# Patient Record
Sex: FEMALE | Race: WHITE | NOT HISPANIC OR LATINO | Employment: FULL TIME | URBAN - METROPOLITAN AREA
[De-identification: names, ages, dates, MRNs, and addresses within clinical notes are randomized per-mention and may not be internally consistent; named-entity substitution may affect disease eponyms.]

---

## 2017-06-07 ENCOUNTER — OFFICE VISIT (OUTPATIENT)
Dept: DERMATOLOGY | Facility: CLINIC | Age: 37
End: 2017-06-07
Attending: DERMATOLOGY
Payer: COMMERCIAL

## 2017-06-07 DIAGNOSIS — D48.5 NEOPLASM OF UNCERTAIN BEHAVIOR OF SKIN: Primary | ICD-10-CM

## 2017-06-07 PROCEDURE — 11100 HC BIOPSY SKIN/SUBQ/MUC MEM, SINGLE LESION: CPT | Mod: ZF | Performed by: DERMATOLOGY

## 2017-06-07 PROCEDURE — 88305 TISSUE EXAM BY PATHOLOGIST: CPT | Performed by: DERMATOLOGY

## 2017-06-07 NOTE — LETTER
6/7/2017      RE: Lexii Mendoza  [address] [place] [address]       DERMATOLOGY CLINIC VISIT NOTE      S: Patient presents with enlarging subcutaneous papule on the L extensor forearm. No past treatment.  Also notes new intermittent hand swelling.     Patient Active Problem List   Diagnosis     NO ACTIVE PROBLEMS     Encounter for IUD insertion       No Known Allergies      Current Outpatient Prescriptions   Medication     albuterol (PROAIR HFA, PROVENTIL HFA, VENTOLIN HFA) 108 (90 BASE) MCG/ACT inhaler     adapalene (DIFFERIN) 0.1 % LOTN     IBUPROFEN PO     No current facility-administered medications for this visit.          O:  Exam with 1 cm subcutaneous mobile nodule on the extensor surface of the proximal L forearm.     Procedure Note:  Patient was consented to a punch biopsy. Area was cleansed with alcohol pad and area was infiltrated with 1ml of lidocaine with epinephrine. A 4 mm punch biopsy was performed and lesion was closed with a 4.0 prolene suture. Wound dressed with petrolatum and bandage. Patient to change dressing daily. Suture removal in 10 days.         A/P:  Neoplasm of uncertain behavior on the L extensor forearm. Biopsy today to evaluate for inflammatory /rheumatological etiologies such as rheumatoid nodule, interstitial granulomatous dermatitis, vs other dermal infiltrates.     F/up pending biopsy results.     Syeda Rowe MD  Dermatology Staff

## 2017-06-07 NOTE — MR AVS SNAPSHOT
After Visit Summary   6/7/2017    Lexii Mendoza    MRN: [id number]           Patient Information     Date Of Birth          1980        Visit Information        Provider Department      6/7/2017 9:10 AM Syeda Rowe MD [facility]        Today's Diagnoses     Neoplasm of uncertain behavior of skin    -  1       Follow-ups after your visit        Who to contact     Please call your clinic at [phone] to:    Ask questions about your health    Make or cancel appointments    Discuss your medicines    Learn about your test results    Speak to your doctor   If you have compliments or concerns about an experience at your clinic, or if you wish to file a complaint, please contact [facility] Patient Relations at [phone] or email us at [email or web address]         Additional Information About Your Visit        MyChart Information     [employer] gives you secure access to your electronic health record. If you see a primary care provider, you can also send messages to your care team and make appointments. If you have questions, please call your primary care clinic.  If you do not have a primary care provider, please call [phone] and they will assist you.      [employer] is an electronic gateway that provides easy, online access to your medical records. With [employer], you can request a clinic appointment, read your test results, renew a prescription or communicate with your care team.     To access your existing account, please contact your [facility] Clinic or call [phone] for assistance.        Care EveryWhere ID     This is your Care EveryWhere ID. This could be used by other organizations to access your [place] medical records  [id number]         Blood Pressure from Last 3 Encounters:   04/18/16 114/70   03/17/16 105/72   11/21/14 111/56    Weight from Last 3 Encounters:   04/18/16 117 lb 9.6 oz  (53.3 kg)   11/21/14 120 lb (54.4 kg)   12/07/13 130 lb (59 kg)              We Performed the Following     BIOPSY SKIN/SUBQ/MUC MEM, SINGLE LESION     Surgical pathology exam          Today's Medication Changes          These changes are accurate as of: 6/7/17 11:59 PM.  If you have any questions, ask your nurse or doctor.               Start taking these medicines.        Dose/Directions    lidocaine 1% with EPINEPHrine 1:100,000 1 %-1:041593 injection   Used for:  Neoplasm of uncertain behavior of skin        Dose:  3 mL   Inject 3 mLs into the skin once for 1 dose   Quantity:  3 mL   Refills:  0            Where to get your medicines      Some of these will need a paper prescription and others can be bought over the counter.  Ask your nurse if you have questions.     You don't need a prescription for these medications     lidocaine 1% with EPINEPHrine 1:100,000 1 %-1:480836 injection                Primary Care Provider Office Phone # Fax #    Zoila Harden [phone]       [facility] [address] [place] [address]        Thank you!     Thank you for choosing [facility]  for your care. Our goal is always to provide you with excellent care. Hearing back from our patients is one way we can continue to improve our services. Please take a few minutes to complete the written survey that you may receive in the mail after your visit with us. Thank you!             Your Updated Medication List - Protect others around you: Learn how to safely use, store and throw away your medicines at www.disposemymeds.org.          This list is accurate as of: 6/7/17 11:59 PM.  Always use your most recent med list.                   Brand Name Dispense Instructions for use    adapalene 0.1 % Lotn    DIFFERIN    3 Bottle    Externally apply topically At Bedtime Please dispense 3 month supply.  59 ml = 1 month supply       albuterol 108 (90 BASE) MCG/ACT Inhaler     PROAIR HFA/PROVENTIL HFA/VENTOLIN HFA    1 Inhaler    Inhale 2 puffs into the lungs every 6 hours as needed for shortness of breath / dyspnea or wheezing       IBUPROFEN PO      Take 400 mg by mouth       lidocaine 1% with EPINEPHrine 1:100,000 1 %-1:688510 injection     3 mL    Inject 3 mLs into the skin once for 1 dose

## 2017-06-08 RX ORDER — LIDOCAINE HYDROCHLORIDE AND EPINEPHRINE 10; 10 MG/ML; UG/ML
3 INJECTION, SOLUTION INFILTRATION; PERINEURAL ONCE
Qty: 3 ML | Refills: 0 | OUTPATIENT
Start: 2017-06-08 | End: 2017-06-08

## 2017-06-08 NOTE — PROGRESS NOTES
DERMATOLOGY CLINIC VISIT NOTE      S: Patient presents with enlarging subcutaneous papule on the L extensor forearm. No past treatment.  Also notes new intermittent hand swelling.     Patient Active Problem List   Diagnosis     NO ACTIVE PROBLEMS     Encounter for IUD insertion       No Known Allergies      Current Outpatient Prescriptions   Medication     albuterol (PROAIR HFA, PROVENTIL HFA, VENTOLIN HFA) 108 (90 BASE) MCG/ACT inhaler     adapalene (DIFFERIN) 0.1 % LOTN     IBUPROFEN PO     No current facility-administered medications for this visit.          O:  Exam with 1 cm subcutaneous mobile nodule on the extensor surface of the proximal L forearm.     Procedure Note:  Patient was consented to a punch biopsy. Area was cleansed with alcohol pad and area was infiltrated with 1ml of lidocaine with epinephrine. A 4 mm punch biopsy was performed and lesion was closed with a 4.0 prolene suture. Wound dressed with petrolatum and bandage. Patient to change dressing daily. Suture removal in 10 days.         A/P:  Neoplasm of uncertain behavior on the L extensor forearm. Biopsy today to evaluate for inflammatory /rheumatological etiologies such as rheumatoid nodule, interstitial granulomatous dermatitis, vs other dermal infiltrates.     F/up pending biopsy results.     Syeda Rowe MD  Dermatology Staff

## 2017-06-12 LAB — COPATH REPORT: NORMAL

## 2017-06-14 ENCOUNTER — TRANSFERRED RECORDS (OUTPATIENT)
Dept: HEALTH INFORMATION MANAGEMENT | Facility: CLINIC | Age: 37
End: 2017-06-14

## 2017-06-28 ENCOUNTER — TRANSFERRED RECORDS (OUTPATIENT)
Dept: HEALTH INFORMATION MANAGEMENT | Facility: CLINIC | Age: 37
End: 2017-06-28

## 2017-06-29 ENCOUNTER — TELEPHONE (OUTPATIENT)
Dept: DERMATOLOGY | Facility: CLINIC | Age: 37
End: 2017-06-29

## 2017-06-29 NOTE — TELEPHONE ENCOUNTER
CHERELLE received from Dr. Kimberli Moralez a local Rheumatologist calling to discuss patients Biopsy results. She would like Dr. Abreu to call her back on her Cell [phone].    Laure Rodgers RN

## 2017-08-16 DIAGNOSIS — L01.00 IMPETIGO: ICD-10-CM

## 2017-08-16 DIAGNOSIS — L30.9 DERMATITIS: Primary | ICD-10-CM

## 2017-08-16 RX ORDER — TRIAMCINOLONE ACETONIDE 1 MG/G
OINTMENT TOPICAL
Qty: 80 G | Refills: 5 | Status: SHIPPED | OUTPATIENT
Start: 2017-08-16 | End: 2019-04-25

## 2017-08-16 RX ORDER — MUPIROCIN 20 MG/G
OINTMENT TOPICAL
Qty: 22 G | Refills: 5 | Status: SHIPPED | OUTPATIENT
Start: 2017-08-16 | End: 2018-03-09

## 2017-09-13 DIAGNOSIS — H01.139 EYELID DERMATITIS, ECZEMATOUS, UNSPECIFIED LATERALITY: Primary | ICD-10-CM

## 2017-09-13 RX ORDER — TACROLIMUS 1 MG/G
OINTMENT TOPICAL
Qty: 30 G | Refills: 5 | Status: SHIPPED | OUTPATIENT
Start: 2017-09-13 | End: 2018-03-09

## 2017-09-25 ENCOUNTER — TRANSFERRED RECORDS (OUTPATIENT)
Dept: HEALTH INFORMATION MANAGEMENT | Facility: CLINIC | Age: 37
End: 2017-09-25

## 2017-09-25 LAB
CREAT SERPL-MCNC: 0.67 MG/DL (ref 0.5–1.3)
GFR SERPL CREATININE-BSD FRML MDRD: 104.7 MLMIN/1.73M2

## 2017-10-25 ENCOUNTER — TRANSFERRED RECORDS (OUTPATIENT)
Dept: HEALTH INFORMATION MANAGEMENT | Facility: CLINIC | Age: 37
End: 2017-10-25

## 2017-10-25 LAB
ALT SERPL-CCNC: 26 IU/L (ref 5–35)
AST SERPL-CCNC: 30 U/L (ref 5–34)
CREAT SERPL-MCNC: 0.66 MG/DL (ref 0.5–1.3)
GFR SERPL CREATININE-BSD FRML MDRD: 106.5 ML/MIN/1.73M2
HEP C HIM: NORMAL

## 2017-11-20 ENCOUNTER — TRANSFERRED RECORDS (OUTPATIENT)
Dept: HEALTH INFORMATION MANAGEMENT | Facility: CLINIC | Age: 37
End: 2017-11-20

## 2017-11-20 LAB
ALT SERPL-CCNC: 48 IU/L (ref 5–35)
AST SERPL-CCNC: 40 U/L (ref 5–34)
CREAT SERPL-MCNC: 0.62 MG/DL (ref 0.5–1.3)
GFR SERPL CREATININE-BSD FRML MDRD: 114.5 ML/MIN/1.73M2

## 2017-12-18 DIAGNOSIS — Z79.899 LONG TERM USE OF DRUG: Primary | ICD-10-CM

## 2017-12-18 LAB
ALBUMIN SERPL-MCNC: 4 G/DL (ref 3.4–5)
ALP SERPL-CCNC: 69 U/L (ref 40–150)
ALT SERPL W P-5'-P-CCNC: 34 U/L (ref 0–50)
ANION GAP SERPL CALCULATED.3IONS-SCNC: 5 MMOL/L (ref 3–14)
AST SERPL W P-5'-P-CCNC: 29 U/L (ref 0–45)
BASOPHILS # BLD AUTO: 0 10E9/L (ref 0–0.2)
BASOPHILS NFR BLD AUTO: 0.5 %
BILIRUB DIRECT SERPL-MCNC: 0.2 MG/DL (ref 0–0.2)
BILIRUB SERPL-MCNC: 0.7 MG/DL (ref 0.2–1.3)
BUN SERPL-MCNC: 14 MG/DL (ref 7–30)
CALCIUM SERPL-MCNC: 9.1 MG/DL (ref 8.5–10.1)
CHLORIDE SERPL-SCNC: 109 MMOL/L (ref 94–109)
CO2 SERPL-SCNC: 27 MMOL/L (ref 20–32)
CREAT SERPL-MCNC: 0.78 MG/DL (ref 0.52–1.04)
DIFFERENTIAL METHOD BLD: NORMAL
EOSINOPHIL # BLD AUTO: 0.1 10E9/L (ref 0–0.7)
EOSINOPHIL NFR BLD AUTO: 3.2 %
ERYTHROCYTE [DISTWIDTH] IN BLOOD BY AUTOMATED COUNT: 13.4 % (ref 10–15)
GFR SERPL CREATININE-BSD FRML MDRD: 82 ML/MIN/1.7M2
GLUCOSE SERPL-MCNC: 60 MG/DL (ref 70–99)
HCT VFR BLD AUTO: 41.1 % (ref 35–47)
HGB BLD-MCNC: 13.9 G/DL (ref 11.7–15.7)
IMM GRANULOCYTES # BLD: 0 10E9/L (ref 0–0.4)
IMM GRANULOCYTES NFR BLD: 0 %
LYMPHOCYTES # BLD AUTO: 1.7 10E9/L (ref 0.8–5.3)
LYMPHOCYTES NFR BLD AUTO: 39.6 %
MCH RBC QN AUTO: 31.9 PG (ref 26.5–33)
MCHC RBC AUTO-ENTMCNC: 33.8 G/DL (ref 31.5–36.5)
MCV RBC AUTO: 94 FL (ref 78–100)
MONOCYTES # BLD AUTO: 0.4 10E9/L (ref 0–1.3)
MONOCYTES NFR BLD AUTO: 8.1 %
NEUTROPHILS # BLD AUTO: 2.1 10E9/L (ref 1.6–8.3)
NEUTROPHILS NFR BLD AUTO: 48.6 %
NRBC # BLD AUTO: 0 10*3/UL
NRBC BLD AUTO-RTO: 0 /100
PLATELET # BLD AUTO: 205 10E9/L (ref 150–450)
POTASSIUM SERPL-SCNC: 4.6 MMOL/L (ref 3.4–5.3)
PROT SERPL-MCNC: 7.5 G/DL (ref 6.8–8.8)
RBC # BLD AUTO: 4.36 10E12/L (ref 3.8–5.2)
SODIUM SERPL-SCNC: 141 MMOL/L (ref 133–144)
WBC # BLD AUTO: 4.3 10E9/L (ref 4–11)

## 2017-12-18 PROCEDURE — 80076 HEPATIC FUNCTION PANEL: CPT | Performed by: INTERNAL MEDICINE

## 2017-12-18 PROCEDURE — 80048 BASIC METABOLIC PNL TOTAL CA: CPT | Performed by: INTERNAL MEDICINE

## 2017-12-18 PROCEDURE — 36415 COLL VENOUS BLD VENIPUNCTURE: CPT | Performed by: INTERNAL MEDICINE

## 2017-12-18 PROCEDURE — 85025 COMPLETE CBC W/AUTO DIFF WBC: CPT | Performed by: INTERNAL MEDICINE

## 2018-01-22 ENCOUNTER — TRANSFERRED RECORDS (OUTPATIENT)
Dept: HEALTH INFORMATION MANAGEMENT | Facility: CLINIC | Age: 38
End: 2018-01-22

## 2018-01-22 LAB
ALT SERPL-CCNC: 27 IU/L (ref 5–35)
AST SERPL-CCNC: 29 U/L (ref 5–34)
CREAT SERPL-MCNC: 0.67 MG/DL (ref 0.5–1.3)
GFR SERPL CREATININE-BSD FRML MDRD: 104.7 ML/MIN/1.73M2

## 2018-02-23 DIAGNOSIS — M19.90 ARTHRITIS: Primary | ICD-10-CM

## 2018-02-26 DIAGNOSIS — Z79.899 LONG TERM USE OF DRUG: ICD-10-CM

## 2018-02-26 DIAGNOSIS — M19.90 ARTHRITIS: ICD-10-CM

## 2018-02-26 LAB
ALBUMIN SERPL-MCNC: 4 G/DL (ref 3.4–5)
ALP SERPL-CCNC: 68 U/L (ref 40–150)
ALT SERPL W P-5'-P-CCNC: 39 U/L (ref 0–50)
AST SERPL W P-5'-P-CCNC: 34 U/L (ref 0–45)
BILIRUB DIRECT SERPL-MCNC: 0.2 MG/DL (ref 0–0.2)
BILIRUB SERPL-MCNC: 0.9 MG/DL (ref 0.2–1.3)
PROT SERPL-MCNC: 7.7 G/DL (ref 6.8–8.8)

## 2018-02-26 PROCEDURE — 80076 HEPATIC FUNCTION PANEL: CPT | Performed by: DERMATOLOGY

## 2018-02-26 PROCEDURE — 86617 LYME DISEASE ANTIBODY: CPT | Performed by: DERMATOLOGY

## 2018-02-26 PROCEDURE — 36415 COLL VENOUS BLD VENIPUNCTURE: CPT | Performed by: DERMATOLOGY

## 2018-02-28 LAB
B BURGDOR IGG SER QL IB: NEGATIVE
B BURGDOR IGM SER QL IB: NEGATIVE

## 2018-03-09 ENCOUNTER — OFFICE VISIT (OUTPATIENT)
Dept: OBGYN | Facility: CLINIC | Age: 38
End: 2018-03-09
Payer: COMMERCIAL

## 2018-03-09 VITALS
DIASTOLIC BLOOD PRESSURE: 67 MMHG | OXYGEN SATURATION: 100 % | WEIGHT: 118.6 LBS | HEART RATE: 57 BPM | BODY MASS INDEX: 19.06 KG/M2 | TEMPERATURE: 97.4 F | HEIGHT: 66 IN | SYSTOLIC BLOOD PRESSURE: 105 MMHG

## 2018-03-09 DIAGNOSIS — Z00.00 ROUTINE GENERAL MEDICAL EXAMINATION AT A HEALTH CARE FACILITY: Primary | ICD-10-CM

## 2018-03-09 DIAGNOSIS — Z30.433 ENCOUNTER FOR IUD REMOVAL AND REINSERTION: ICD-10-CM

## 2018-03-09 DIAGNOSIS — Z30.431 IUD CHECK UP: ICD-10-CM

## 2018-03-09 PROCEDURE — 58301 REMOVE INTRAUTERINE DEVICE: CPT | Performed by: OBSTETRICS & GYNECOLOGY

## 2018-03-09 PROCEDURE — 58300 INSERT INTRAUTERINE DEVICE: CPT | Performed by: OBSTETRICS & GYNECOLOGY

## 2018-03-09 PROCEDURE — 87624 HPV HI-RISK TYP POOLED RSLT: CPT | Performed by: OBSTETRICS & GYNECOLOGY

## 2018-03-09 PROCEDURE — G0145 SCR C/V CYTO,THINLAYER,RESCR: HCPCS | Performed by: OBSTETRICS & GYNECOLOGY

## 2018-03-09 PROCEDURE — 99395 PREV VISIT EST AGE 18-39: CPT | Mod: 25 | Performed by: OBSTETRICS & GYNECOLOGY

## 2018-03-09 NOTE — PATIENT INSTRUCTIONS

## 2018-03-09 NOTE — LETTER
March 19, 2018    Lexii Mendoza  [address] [place] [address]    Dear Lexii,  We are happy to inform you that your PAP smear result from 03/09/18 is normal.  We are now able to do a follow up test on PAP smears. The DNA test is for HPV (Human Papilloma Virus). Cervical cancer is closely linked with certain types of HPV. Your result showed no evidence of high risk HPV.  Therefore we recommend you return in 3 years for your next pap smear.  You will still need to return to the clinic every year for an annual exam and other preventive tests.  Please contact the clinic at [phone] with any questions.  Sincerely,    Zoila Harden MD/[facility]

## 2018-03-09 NOTE — MR AVS SNAPSHOT
After Visit Summary   3/9/2018    Lexii Mendoza    MRN: [id number]           Patient Information     Date Of Birth          1980        Visit Information        Provider Department      3/9/2018 9:00 AM Zoila Harden [place] [facility]        Today's Diagnoses     Routine general medical examination at a health care facility    -  1    Encounter for IUD removal and reinsertion        IUD check up          Care Instructions    What Mirena Users May Expect    What to watch for right after Mirena is placed  Some women may experience uterine cramps, bleeding, and/or dizziness during and right after Mirena is placed. To help minimize the cramps, you may taken ibuprofen 600 mg with food prior to your appointment. These symptoms should improve over the next 24 hours.  Mild cramping may be present for a few days after your placement  As a follow up, you should check your strings on 4 weeks or visit your clinic once in the first 4 to 12 weeks after Mirena is placed to make sure it is in the right position. After that, Mirena can be checked once a year as part of your routine exam.    Please use a back-up method (abstinence or condoms) for 5 days after placement.    Your periods may change  For the first 3 to 6 months, your monthly period may become irregular. You may also have frequent spotting or light bleeding. A few women have heavy bleeding during this time. After your body adjusts, the number of bleeding days is likely to decrease (but may remain irregular), and you may even find that your periods stop altogether for as long as Mirena is in place. Around the end of the third month of use, you may see up to a 75% reduction in the amount of menstrual bleeding. By one year, about 1 out of 5 users may hay have no period at all. At the end of two years, 70% have little or no bleeding. Your periods will return rapidly once Mirena is removed.     Mirena Strings  You may check your own Mirena  strings by inserting a finger into the vagina and feeling the strings as they exit the cervix.  The strings will initially feel firm, like fishing line, but will soften over a few weeks.  After the strings have softened, you or your partner should not be able to feel the strings during intercourse.  If you can feel the IUD, see your healthcare provider to have the position confirmed.  You may use tampons with Mirena in place.    Mirena does not protect against HIV or STDs.  Mirena does not prevent the formation of ovarian cysts.  Mirena does not typically reduce acne or cause weight gain or mood changes.    Please call [facility] at [phone] if you have questions or concerns.    For more information:  [email or web address]            Follow-ups after your visit        Future tests that were ordered for you today     Open Future Orders        Priority Expected Expires Ordered    US Pelvic Complete w Transvaginal Routine  3/9/2019 3/9/2018            Who to contact     If you have questions or need follow up information about today's clinic visit or your schedule please contact [facility] directly at [phone].  Normal or non-critical lab and imaging results will be communicated to you by MyChart, letter or phone within 4 business days after the clinic has received the results. If you do not hear from us within 7 days, please contact the clinic through [employer] or phone. If you have a critical or abnormal lab result, we will notify you by phone as soon as possible.  Submit refill requests through [employer] or call your pharmacy and they will forward the refill request to us. Please allow 3 business days for your refill to be completed.          Additional Information About Your Visit        MyChart Information     [employer] gives you secure access to your electronic health record. If you see a primary care provider, you can also send messages to your care team and make  "appointments. If you have questions, please call your primary care clinic.  If you do not have a primary care provider, please call [phone] and they will assist you.        Care EveryWhere ID     This is your Care EveryWhere ID. This could be used by other organizations to access your [place] medical records  [id number]        Your Vitals Were     Pulse Temperature Height Pulse Oximetry Breastfeeding? BMI (Body Mass Index)    57 97.4  F (36.3  C) (Oral) 5' 6\" (1.676 m) 100% No 19.14 kg/m2       Blood Pressure from Last 3 Encounters:   03/09/18 105/67   04/18/16 114/70   03/17/16 105/72    Weight from Last 3 Encounters:   03/09/18 118 lb 9.6 oz (53.8 kg)   04/18/16 117 lb 9.6 oz (53.3 kg)   11/21/14 120 lb (54.4 kg)              We Performed the Following     HC LEVONORGESTREL IU 52MG 5 YR     HPV High Risk Types DNA Cervical     INSERTION INTRAUTERINE DEVICE     Pap imaged thin layer screen with HPV - recommended age 30 - 65 years (select HPV order below)     REMOVE INTRAUTERINE DEVICE          Today's Medication Changes          These changes are accurate as of 3/9/18 12:57 PM.  If you have any questions, ask your nurse or doctor.               Start taking these medicines.        Dose/Directions    levonorgestrel 20 MCG/24HR IUD   Commonly known as:  MIRENA   Used for:  Encounter for IUD removal and reinsertion   Started by:  Zoila Harden MD        Dose:  1 each   1 each (20 mcg) by Intrauterine route once for 1 dose   Quantity:  1 each   Refills:  0            Where to get your medicines      Some of these will need a paper prescription and others can be bought over the counter.  Ask your nurse if you have questions.     You don't need a prescription for these medications     levonorgestrel 20 MCG/24HR IUD                Primary Care Provider Office Phone # Fax #    Zoila Harden [phone] [address] [facility] [address]  [facility] [address]        Equal Access to Services     " LUPE EMANUEL : Hadii aad ku barbara Warner, waaxda luqadaha, qaybta kaalmada adebrigid, jayla damianin hayaamejia hayes vega leny . So [facility] [phone].    ATENCIÓN: Si habla español, tiene a callahan disposición servicios gratuitos de asistencia lingüística. Llame al [phone].    We comply with applicable federal civil rights laws and [place] laws. We do not discriminate on the basis of race, color, national origin, age, disability, sex, sexual orientation, or gender identity.            Thank you!     Thank you for [place] [facility]  [place]. Our goal is always to provide you with excellent care. Hearing back from our patients is one way we can continue to improve our services. Please take a few minutes to complete the written survey that you may receive in the mail after your visit with us. Thank you!             Your Updated Medication List - Protect others around you: Learn how to safely use, store and throw away your medicines at www.disposemymeds.org.          This list is accurate as of 3/9/18 12:57 PM.  Always use your most recent med list.                   Brand Name Dispense Instructions for use Diagnosis    adapalene 0.1 % Lotn    DIFFERIN    3 Bottle    Externally apply topically At Bedtime Please dispense 3 month supply.  59 ml = 1 month supply    Acne vulgaris       ENBREL SC      Inject 50 mg Subcutaneous once a week        FOLIC ACID PO      Take 1 mg by mouth daily        IBUPROFEN PO      Take 400 mg by mouth        levonorgestrel 20 MCG/24HR IUD    MIRENA    1 each    1 each (20 mcg) by Intrauterine route once for 1 dose    Encounter for IUD removal and reinsertion       MELOXICAM PO      Take 150 mg by mouth daily        METHOTREXATE PO      Take 2.5 mg by mouth once a week        MULTIVITAMIN ADULTS PO           PRILOSEC OTC PO           triamcinolone 0.1 % ointment    KENALOG    80 g    To rash areas twice daily as needed.    Dermatitis

## 2018-03-09 NOTE — PROGRESS NOTES
"Chief Complaint   Patient presents with     Physical     IUD     removal and replacement.  MIRENA   lOT: [id number]  EXP:     NDC: 59130-391-52        Initial /67 (BP Location: Left arm, Patient Position: Sitting, Cuff Size: Adult Regular)  Pulse 57  Temp 97.4  F (36.3  C) (Oral)  Ht 5' 6\" (1.676 m)  Wt 118 lb 9.6 oz (53.8 kg)  SpO2 100%  Breastfeeding? No  BMI 19.14 kg/m2 Estimated body mass index is 19.14 kg/(m^2) as calculated from the following:    Height as of this encounter: 5' 6\" (1.676 m).    Weight as of this encounter: 118 lb 9.6 oz (53.8 kg).  BP completed using cuff size: regular        The following HM Due: NONE      The following patient reported/Care Every where data was sent to:  P ABSTRACT QUALITY INITIATIVES [00811]  n/a      n/a and patient has appointment for today       removal and replacement.  MIRENA   lOT: [id number]  EXP:     NDC: 12428-585-66       Lexii is a 37 year old  female who presents for annual exam.     Menses are absent due to IUD.  No LMP recorded. Patient is not currently having periods (Reason: IUD).. Using mirena IUD for contraception and is due for replacement today.  She is not currently considering pregnancy.  Besides routine health maintenance,  she would like to discuss IUD replacement, and update new conditions.  In the past year, she was diagnosed with arthritis NOS.  It has not responded the way it typically does to meds, so that has been concerning to her.  She is now on Enbrel and mtx and thinks it may be helping, but still worries something more is going on.  She does not feel like she has overwhelming anxiety or is concerned about depression, but will continue to monitor.  They are going to [place] to [facility] during spring break.  GYNECOLOGIC HISTORY:  Menarche: 12  Age at first intercourse: 18 Number of lifetime partners: less than 6  Lexii is sexually active with male partner(s) and is currently in monogamous relationship with " .    History sexually transmitted infections:No STD history  STI testing offered?  Declined  GRAYSON exposure: Unknown  History of abnormal Pap smear: NO - age 30- 65 PAP every 3 years recommended  Family history of breast CA: No  Family history of uterine/ovarian CA: No    Family history of colon CA: No    HEALTH MAINTENANCE:  Cholesterol: (  Cholesterol   Date Value Ref Range Status   2014 141 <200 mg/dL Final     Comment:     LDL Cholesterol is the primary guide to therapy.   The NCEP recommends further evaluation of: patients with cholesterol greater   than 200 mg/dL if additional risk factors are present, cholesterol greater   than   240 mg/dL, triglycerides greater than 150 mg/dL, or HDL less than 40 mg/dL.      History of abnormal lipids: No  Mammo: n/a . History of abnormal Mammo: Not applicable.  Regular Self Breast Exams: Yes  Calcium/Vitamin D intake: source:  dairy, dietary supplement(s), green leafy veggies Adequate? Yes  TSH: (  TSH   Date Value Ref Range Status   2016 1.31 0.40 - 4.00 mU/L Final    )  Pap; (  Lab Results   Component Value Date    PAP NIL 2016    PAP NIL 2013    PAP UNSAT 2012    )    HISTORY:  Obstetric History       T2      L3     SAB1   TAB0   Ectopic0   Multiple1   Live Births3       # Outcome Date GA Lbr Kostas/2nd Weight Sex Delivery Anes PTL Lv   3A Term 12 38w1d 05:55 / 00:34 5 lb 8.2 oz (2.5 kg) F Vag-Spont EPI N DEISY      Name: EAMONG1 BROOKE      Apgar1:  9                Apgar5: 9   3B Term 12 38w1d 05:55 / 00:49 6 lb 4.5 oz (2.85 kg) F Vag-Spont EPI N DEISY      Name: EAMON,G2 BROOKE      Apgar1:  7                Apgar5: 8   2 Term 10/07/10    F    DEISY   1 SAB      SAB           Past Medical History:   Diagnosis Date     Arthritis      Breast disorder     extra breast tissue under right armpit     Fertility problem     in-vitro x2 with 2 resulting pregnancies     NO ACTIVE PROBLEMS      Varicosities      Past  Surgical History:   Procedure Laterality Date     NO HISTORY OF SURGERY       Family History   Problem Relation Age of Onset     Family History Negative Other      Social History     Social History     Marital status:      Spouse name: N/A     Number of children: N/A     Years of education: N/A     Social History Main Topics     Smoking status: Never Smoker     Smokeless tobacco: Never Used     Alcohol use 1.2 oz/week     2 Standard drinks or equivalent per week     Drug use: No     Sexual activity: Yes     Partners: Male     Birth control/ protection: IUD     Other Topics Concern     Not on file     Social History Narrative       Current Outpatient Prescriptions:      METHOTREXATE PO, Take 2.5 mg by mouth once a week, Disp: , Rfl:      Etanercept (ENBREL SC), Inject 50 mg Subcutaneous once a week, Disp: , Rfl:      MELOXICAM PO, Take 150 mg by mouth daily, Disp: , Rfl:      Omeprazole Magnesium (PRILOSEC OTC PO), , Disp: , Rfl:      FOLIC ACID PO, Take 1 mg by mouth daily, Disp: , Rfl:      Multiple Vitamins-Minerals (MULTIVITAMIN ADULTS PO), , Disp: , Rfl:      triamcinolone (KENALOG) 0.1 % ointment, To rash areas twice daily as needed., Disp: 80 g, Rfl: 5     adapalene (DIFFERIN) 0.1 % LOTN, Externally apply topically At Bedtime Please dispense 3 month supply.  59 ml = 1 month supply, Disp: 3 Bottle, Rfl: 3     IBUPROFEN PO, Take 400 mg by mouth, Disp: , Rfl:      Allergies   Allergen Reactions     Sulfasalazine Rash     Rash and elevated LFT       Past medical, surgical, social and family history were reviewed and updated in EPIC.    ROS:   C:     NEGATIVE for fever, chills, change in weight  I:       NEGATIVE for worrisome rashes, moles or lesions  E:     NEGATIVE for vision changes or irritation  E/M: NEGATIVE for ear, mouth and throat problems  R:     NEGATIVE for significant cough or SOB  CV:   NEGATIVE for chest pain, palpitations or peripheral edema  GI:     NEGATIVE for nausea, abdominal pain,  "heartburn, or change in bowel habits  :   NEGATIVE for frequency, dysuria, hematuria, vaginal discharge, or irregular bleeding  M:     NEGATIVE for significant arthralgias or myalgia  N:      NEGATIVE for weakness, dizziness or paresthesias  E:      NEGATIVE for temperature intolerance, skin/hair changes  P:      NEGATIVE for changes in mood or affect.    EXAM:  /67 (BP Location: Left arm, Patient Position: Sitting, Cuff Size: Adult Regular)  Pulse 57  Temp 97.4  F (36.3  C) (Oral)  Ht 5' 6\" (1.676 m)  Wt 118 lb 9.6 oz (53.8 kg)  SpO2 100%  Breastfeeding? No  BMI 19.14 kg/m2   BMI: Body mass index is 19.14 kg/(m^2).  Constitutional: healthy, alert and no distress  Head: Normocephalic. No masses, lesions, tenderness or abnormalities  Neck: Neck supple. Trachea midline. No adenopathy. Thyroid symmetric, normal size.   Cardiovascular: RRR.   Respiratory: Negative.   Breast: No nodularity, asymmetry or nipple discharge bilaterally.  Gastrointestinal: Abdomen soft, non-tender, non-distended. No masses, organomegaly.  :  Vulva:  No external lesions, normal female hair distribution, no inguinal adenopathy.    Urethra:  Midline, non-tender, well supported, no discharge  Vagina:  Moist, pink, no abnormal discharge, no lesions.  iud strings visible  Uterus:  Normal size, non-tender, freely mobile  Ovaries:  No masses appreciated, non-tender, mobile  Rectal Exam: deferred  Musculoskeletal: extremities normal  Skin: no suspicious lesions or rashes  Psychiatric: Affect appropriate, cooperative,mentation appears normal.       PROCEDURE:  After informed consent was obtained from the patient, a speculum was placed in the vagina to visualized the cervix.  The IUD strings were grasped with a ring forcep and easily delivered.  The cervix was then swabbed with a betadine prep.  Tenaculum was placed at the 12 o'clock position on the cervix and the uterus sounded to 7cm.  The Mirena  IUD was then placed in the usual " fashion under sterile technique.  Strings were clipped about 3-4 cm from the cervical os.  Tenaculum was removed and cervix was hemostatic. There were no complications. The patient tolerated the procedure well.      COUNSELING:   Reviewed preventive health counseling, as reflected in patient instructions  Special attention given to:        Regular exercise       Healthy diet/nutrition       Contraception       Osteoporosis Prevention/Bone Health       Safe sex practices/STD prevention   reports that she has never smoked. She has never used smokeless tobacco.    Body mass index is 19.14 kg/(m^2).    FRAX Risk Assessment    ASSESSMENT:  37 year old female with satisfactory annual exam. Contraception, successful IUD removal and replacement.  Plan: costesting done.  Labs done with rhuematology.  IUD replaced successfully.  The patient should feel for the IUD strings after her next menses.  If unable to locate them, she should return to clinic for a speculum examination for confirmation that the IUD is in place. Bleeding pattern of this particular IUD was discussed with the patient. She is aware that the IUD will need to be removed in 5 years or PRN.  She is to return to clinic for her next annual or PRN.  RTC yearly or prn.     KARIN BHANDARI MD

## 2018-03-13 LAB
COPATH REPORT: NORMAL
PAP: NORMAL

## 2018-03-14 LAB
FINAL DIAGNOSIS: NORMAL
HPV HR 12 DNA CVX QL NAA+PROBE: NEGATIVE
HPV16 DNA SPEC QL NAA+PROBE: NEGATIVE
HPV18 DNA SPEC QL NAA+PROBE: NEGATIVE
SPECIMEN DESCRIPTION: NORMAL
SPECIMEN SOURCE CVX/VAG CYTO: NORMAL

## 2018-03-19 ENCOUNTER — HOSPITAL ENCOUNTER (OUTPATIENT)
Dept: ULTRASOUND IMAGING | Facility: CLINIC | Age: 38
Discharge: HOME | End: 2018-03-19
Attending: OBSTETRICS & GYNECOLOGY | Admitting: OBSTETRICS & GYNECOLOGY
Payer: COMMERCIAL

## 2018-03-19 DIAGNOSIS — Z30.431 IUD CHECK UP: ICD-10-CM

## 2018-03-19 PROCEDURE — 76856 US EXAM PELVIC COMPLETE: CPT

## 2018-04-09 ENCOUNTER — TRANSFERRED RECORDS (OUTPATIENT)
Dept: HEALTH INFORMATION MANAGEMENT | Facility: CLINIC | Age: 38
End: 2018-04-09

## 2018-04-09 LAB
ALT SERPL-CCNC: 25 IU/L (ref 5–35)
AST SERPL-CCNC: 27 U/L (ref 5–34)
CREAT SERPL-MCNC: 0.67 MG/DL (ref 0.5–1.3)
GFR SERPL CREATININE-BSD FRML MDRD: 104.1 ML/MIN/1.73M2

## 2018-05-17 ENCOUNTER — HOSPITAL ENCOUNTER (OUTPATIENT)
Facility: CLINIC | Age: 38
Setting detail: SPECIMEN
Discharge: HOME | End: 2018-05-17
Attending: INTERNAL MEDICINE | Admitting: INTERNAL MEDICINE
Payer: COMMERCIAL

## 2018-05-17 DIAGNOSIS — Z79.899 LONG TERM USE OF DRUG: ICD-10-CM

## 2018-05-17 LAB
ALBUMIN SERPL-MCNC: 4.3 G/DL (ref 3.4–5)
ALP SERPL-CCNC: 62 U/L (ref 40–150)
ALT SERPL W P-5'-P-CCNC: 29 U/L (ref 0–50)
ANION GAP SERPL CALCULATED.3IONS-SCNC: 8 MMOL/L (ref 3–14)
AST SERPL W P-5'-P-CCNC: 27 U/L (ref 0–45)
BASOPHILS # BLD AUTO: 0 10E9/L (ref 0–0.2)
BASOPHILS NFR BLD AUTO: 0.3 %
BILIRUB DIRECT SERPL-MCNC: 0.2 MG/DL (ref 0–0.2)
BILIRUB SERPL-MCNC: 0.8 MG/DL (ref 0.2–1.3)
BUN SERPL-MCNC: 20 MG/DL (ref 7–30)
CALCIUM SERPL-MCNC: 8.9 MG/DL (ref 8.5–10.1)
CHLORIDE SERPL-SCNC: 105 MMOL/L (ref 94–109)
CO2 SERPL-SCNC: 28 MMOL/L (ref 20–32)
CREAT SERPL-MCNC: 0.72 MG/DL (ref 0.52–1.04)
DIFFERENTIAL METHOD BLD: NORMAL
EOSINOPHIL # BLD AUTO: 0.2 10E9/L (ref 0–0.7)
EOSINOPHIL NFR BLD AUTO: 2.5 %
ERYTHROCYTE [DISTWIDTH] IN BLOOD BY AUTOMATED COUNT: 13.9 % (ref 10–15)
GFR SERPL CREATININE-BSD FRML MDRD: 90 ML/MIN/1.7M2
GLUCOSE SERPL-MCNC: 90 MG/DL (ref 70–99)
HCT VFR BLD AUTO: 40.2 % (ref 35–47)
HGB BLD-MCNC: 13.4 G/DL (ref 11.7–15.7)
IMM GRANULOCYTES # BLD: 0 10E9/L (ref 0–0.4)
IMM GRANULOCYTES NFR BLD: 0.2 %
LYMPHOCYTES # BLD AUTO: 2.4 10E9/L (ref 0.8–5.3)
LYMPHOCYTES NFR BLD AUTO: 40.2 %
MCH RBC QN AUTO: 32.7 PG (ref 26.5–33)
MCHC RBC AUTO-ENTMCNC: 33.3 G/DL (ref 31.5–36.5)
MCV RBC AUTO: 98 FL (ref 78–100)
MONOCYTES # BLD AUTO: 0.5 10E9/L (ref 0–1.3)
MONOCYTES NFR BLD AUTO: 8 %
NEUTROPHILS # BLD AUTO: 2.9 10E9/L (ref 1.6–8.3)
NEUTROPHILS NFR BLD AUTO: 48.8 %
NRBC # BLD AUTO: 0 10*3/UL
NRBC BLD AUTO-RTO: 0 /100
PLATELET # BLD AUTO: 219 10E9/L (ref 150–450)
POTASSIUM SERPL-SCNC: 4.4 MMOL/L (ref 3.4–5.3)
PROT SERPL-MCNC: 7.4 G/DL (ref 6.8–8.8)
RBC # BLD AUTO: 4.1 10E12/L (ref 3.8–5.2)
SODIUM SERPL-SCNC: 141 MMOL/L (ref 133–144)
WBC # BLD AUTO: 6 10E9/L (ref 4–11)

## 2018-05-17 PROCEDURE — 80048 BASIC METABOLIC PNL TOTAL CA: CPT | Performed by: INTERNAL MEDICINE

## 2018-05-17 PROCEDURE — 80076 HEPATIC FUNCTION PANEL: CPT | Performed by: INTERNAL MEDICINE

## 2018-05-17 PROCEDURE — 85025 COMPLETE CBC W/AUTO DIFF WBC: CPT | Performed by: INTERNAL MEDICINE

## 2018-06-14 DIAGNOSIS — L70.0 ACNE VULGARIS: Primary | ICD-10-CM

## 2018-06-14 RX ORDER — ADAPALENE 0.1 G/100G
CREAM TOPICAL
Qty: 45 G | Refills: 11 | Status: SHIPPED | OUTPATIENT
Start: 2018-06-14 | End: 2022-03-31

## 2018-09-05 ENCOUNTER — TELEPHONE (OUTPATIENT)
Dept: DERMATOLOGY | Facility: CLINIC | Age: 38
End: 2018-09-05

## 2018-09-05 NOTE — TELEPHONE ENCOUNTER
Prior Authorization Retail Medication Request    Medication/Dose: Tacrolimus 0.1% ointment  SIG- apply to eyelid rash twice daily until clear  ICD code (if different than what is on RX):  Dermatitis L30.9  Previously Tried and Failed:  hydroquinone 4 % CREAM, triamcinolone (KENALOG) 0.1 % ointment    Rationale:  This medication is prescribed for long term use on the eyelids where topical steroids are not recommended due to thinning of the skin and cataracts     Insurance Name:  [employer]  Insurance ID:  [id number]      Pharmacy Information (if different than what is on RX)  [facility]  Phone:  [phone]

## 2018-09-07 ENCOUNTER — OFFICE VISIT (OUTPATIENT)
Dept: PODIATRY | Facility: CLINIC | Age: 38
End: 2018-09-07
Payer: COMMERCIAL

## 2018-09-07 ENCOUNTER — RADIANT APPOINTMENT (OUTPATIENT)
Dept: GENERAL RADIOLOGY | Facility: CLINIC | Age: 38
End: 2018-09-07
Attending: PODIATRIST
Payer: COMMERCIAL

## 2018-09-07 VITALS
DIASTOLIC BLOOD PRESSURE: 70 MMHG | SYSTOLIC BLOOD PRESSURE: 120 MMHG | HEART RATE: 60 BPM | WEIGHT: 122 LBS | BODY MASS INDEX: 19.69 KG/M2

## 2018-09-07 DIAGNOSIS — M77.8 CAPSULITIS OF FOOT, LEFT: Primary | ICD-10-CM

## 2018-09-07 DIAGNOSIS — M77.8 CAPSULITIS OF FOOT, LEFT: ICD-10-CM

## 2018-09-07 PROCEDURE — 73630 X-RAY EXAM OF FOOT: CPT | Mod: LT

## 2018-09-07 PROCEDURE — 99203 OFFICE O/P NEW LOW 30 MIN: CPT | Performed by: PODIATRIST

## 2018-09-07 NOTE — LETTER
9/7/2018         RE: Lexii Mendoza  [address]  [place] [address]        Dear Colleague,    Thank you for referring your patient, Lexii Mendoza, to the [facility]. Please see a copy of my visit note below.    S:  Patient complains of left foot pain.  Points to plantar second, third, fourth, metatarsal heads.  Describes as a burning pain.  aggravated by activity and relieved by rest.  Has had this for 1 years.  When this started patient was having multiple joint pains.  She went and saw a rheumatologist and was diagnosed with inflammatory spondyloarthropathy.  Patient placed on medications which helped all her pain but in her foot.  This mostly bothers her when she is running.  She feels stiffness in the morning.  Initially had swelling with this.  Another physician sent her to have numerous injections in her forefoot which did help but now she notices her skin is lighter and her interspaces are now atrophied.  He also accidentally interact joint injected her TMTJ's which also caused lightening of her skin no pain anywhere else on feet.  Goes barefoot around house.  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  Denies erythema, edema, weakness, numbness.  Denies arthralgias anywhere else.      ROS:  A 10-point review of systems was performed and is positive for that noted in the HPI and as seen above.  All other areas are negative.        Allergies   Allergen Reactions     Sulfasalazine Rash     Rash and elevated LFT       Current Outpatient Prescriptions   Medication Sig Dispense Refill     adapalene (DIFFERIN) 0.1 % cream To face at bedtime 45 g 11     Etanercept (ENBREL SC) Inject 50 mg Subcutaneous once a week       FOLIC ACID PO Take 1 mg by mouth daily       IBUPROFEN PO Take 400 mg by mouth       levonorgestrel (MIRENA) 20 MCG/24HR IUD 1 each (20 mcg) by Intrauterine route once for 1 dose 1 each 0     MELOXICAM PO Take 150 mg by mouth daily        METHOTREXATE PO Take 2.5 mg by mouth once a week       Multiple Vitamins-Minerals (MULTIVITAMIN ADULTS PO)        Omeprazole Magnesium (PRILOSEC OTC PO)        triamcinolone (KENALOG) 0.1 % ointment To rash areas twice daily as needed. 80 g 5       Patient Active Problem List   Diagnosis     NO ACTIVE PROBLEMS     Encounter for IUD removal and reinsertion       Past Medical History:   Diagnosis Date     Arthritis      Breast disorder     extra breast tissue under right armpit     Fertility problem     in-vitro x2 with 2 resulting pregnancies     NO ACTIVE PROBLEMS      Varicosities        Past Surgical History:   Procedure Laterality Date     NO HISTORY OF SURGERY         Family History   Problem Relation Age of Onset     Family History Negative Other        Social History   Substance Use Topics     Smoking status: Never Smoker     Smokeless tobacco: Never Used     Alcohol use 1.2 oz/week     2 Standard drinks or equivalent per week         O:   /70 (BP Location: Left arm)  Pulse 60  Wt 122 lb (55.3 kg)  BMI 19.69 kg/m2.      Constitutional/ general:  Pt is in no apparent distress, appears well-nourished.  Cooperative with history and physical exam.     Psych:  The patient answered questions appropriately.  Normal affect.  Seems to have reasonable expectations, in terms of treatment.     Eyes:  Visual scanning/ tracking without deficit.     Ears:  Response to auditory stimuli is normal.  No  hearing aid devices.  Auricles in proper alignment.     Lymphatic:  Popliteal lymph nodes not enlarged.     Lungs:  Non labored breathing, non labored speech. No cough.  No audible wheezing. Even, quiet breathing.       Vascular:  Pedal pulses are palpable bilaterally for both the DP and PT arteries.  CFT < 3 sec.  No edema.  Pedal hair growth noted.     Neuro:  Alert and oriented x 3. Coordinated gait.  Light touch sensation is intact to the L4, L5, S1 distributions. No obvious deficits.  No evidence of  neurological-based weakness, spasticity, or contracture in the lower extremities.     Derm: Normal texture and turgor.  No erythema, ecchymosis, or cyanosis.  On the dorsum of the left tarsometatarsal joint and MTPJ's the skin is light and at soft tissue atrophy noted here    Musculoskeletal:    Lower extremity muscle strength is normal.  Patient is ambulatory without an assistive device or brace.  Normal arch with weightbearing.  No forefoot or rear foot deformities noted.  MS 5/5 all compartments.  Normal ROM all fore foot and rearfoot joints.  No equinus.  Pain under left second, third, fourth metatarsal head plantar.  Negative Lachmans test.  Negative Mulders click.  No pain anywhere else.  No erythema, edema, ecchymosis, or subcutaneous masses noted.  Xrays show long second, third fourth metatarsals, but no other bone abnormalities.        A:  Sub second, third, fourth capsulitis left foot     P:  X rays taken of left foot today.  Discussed mechanical causes of this.  Inflammatory spondyloarthropathy may also make her more susceptible to this.    Ice bid.  Instructed to wear stiff soles shoes at all times and I made suggestions.  Discussed stiff work shoes.  We will try rocker-bottom running shoe.  Made suggestions on arch supports and running shoes.  She will call if she would like orthotics.  Avoid activities that bother this and discussed which activities will aggravate.  RETURN TO CLINIC PRN.    Colt Quevedo DPM, FACFAS      Again, thank you for allowing me to participate in the care of your patient.        Sincerely,        Colt Quevedo DPM

## 2018-09-07 NOTE — PROGRESS NOTES
S:  Patient complains of left foot pain.  Points to plantar second, third, fourth, metatarsal heads.  Describes as a burning pain.  aggravated by activity and relieved by rest.  Has had this for 1 years.  When this started patient was having multiple joint pains.  She went and saw a rheumatologist and was diagnosed with inflammatory spondyloarthropathy.  Patient placed on medications which helped all her pain but in her foot.  This mostly bothers her when she is running.  She feels stiffness in the morning.  Initially had swelling with this.  Another physician sent her to have numerous injections in her forefoot which did help but now she notices her skin is lighter and her interspaces are now atrophied.  He also accidentally interact joint injected her TMTJ's which also caused lightening of her skin no pain anywhere else on feet.  Goes barefoot around house.  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  Denies erythema, edema, weakness, numbness.  Denies arthralgias anywhere else.      ROS:  A 10-point review of systems was performed and is positive for that noted in the HPI and as seen above.  All other areas are negative.        Allergies   Allergen Reactions     Sulfasalazine Rash     Rash and elevated LFT       Current Outpatient Prescriptions   Medication Sig Dispense Refill     adapalene (DIFFERIN) 0.1 % cream To face at bedtime 45 g 11     Etanercept (ENBREL SC) Inject 50 mg Subcutaneous once a week       FOLIC ACID PO Take 1 mg by mouth daily       IBUPROFEN PO Take 400 mg by mouth       levonorgestrel (MIRENA) 20 MCG/24HR IUD 1 each (20 mcg) by Intrauterine route once for 1 dose 1 each 0     MELOXICAM PO Take 150 mg by mouth daily       METHOTREXATE PO Take 2.5 mg by mouth once a week       Multiple Vitamins-Minerals (MULTIVITAMIN ADULTS PO)        Omeprazole Magnesium (PRILOSEC OTC PO)        triamcinolone (KENALOG) 0.1 % ointment To rash areas twice daily as needed. 80 g 5        Patient Active Problem List   Diagnosis     NO ACTIVE PROBLEMS     Encounter for IUD removal and reinsertion       Past Medical History:   Diagnosis Date     Arthritis      Breast disorder     extra breast tissue under right armpit     Fertility problem     in-vitro x2 with 2 resulting pregnancies     NO ACTIVE PROBLEMS      Varicosities        Past Surgical History:   Procedure Laterality Date     NO HISTORY OF SURGERY         Family History   Problem Relation Age of Onset     Family History Negative Other        Social History   Substance Use Topics     Smoking status: Never Smoker     Smokeless tobacco: Never Used     Alcohol use 1.2 oz/week     2 Standard drinks or equivalent per week         O:   /70 (BP Location: Left arm)  Pulse 60  Wt 122 lb (55.3 kg)  BMI 19.69 kg/m2.      Constitutional/ general:  Pt is in no apparent distress, appears well-nourished.  Cooperative with history and physical exam.     Psych:  The patient answered questions appropriately.  Normal affect.  Seems to have reasonable expectations, in terms of treatment.     Eyes:  Visual scanning/ tracking without deficit.     Ears:  Response to auditory stimuli is normal.  No  hearing aid devices.  Auricles in proper alignment.     Lymphatic:  Popliteal lymph nodes not enlarged.     Lungs:  Non labored breathing, non labored speech. No cough.  No audible wheezing. Even, quiet breathing.       Vascular:  Pedal pulses are palpable bilaterally for both the DP and PT arteries.  CFT < 3 sec.  No edema.  Pedal hair growth noted.     Neuro:  Alert and oriented x 3. Coordinated gait.  Light touch sensation is intact to the L4, L5, S1 distributions. No obvious deficits.  No evidence of neurological-based weakness, spasticity, or contracture in the lower extremities.     Derm: Normal texture and turgor.  No erythema, ecchymosis, or cyanosis.  On the dorsum of the left tarsometatarsal joint and MTPJ's the skin is light and at soft tissue  atrophy noted here    Musculoskeletal:    Lower extremity muscle strength is normal.  Patient is ambulatory without an assistive device or brace.  Normal arch with weightbearing.  No forefoot or rear foot deformities noted.  MS 5/5 all compartments.  Normal ROM all fore foot and rearfoot joints.  No equinus.  Pain under left second, third, fourth metatarsal head plantar.  Negative Lachmans test.  Negative Mulders click.  No pain anywhere else.  No erythema, edema, ecchymosis, or subcutaneous masses noted.  Xrays show long second, third fourth metatarsals, but no other bone abnormalities.        A:  Sub second, third, fourth capsulitis left foot     P:  X rays taken of left foot today.  Discussed mechanical causes of this.  Inflammatory spondyloarthropathy may also make her more susceptible to this.    Ice bid.  Instructed to wear stiff soles shoes at all times and I made suggestions.  Discussed stiff work shoes.  We will try rocker-bottom running shoe.  Made suggestions on arch supports and running shoes.  She will call if she would like orthotics.  Avoid activities that bother this and discussed which activities will aggravate.  RETURN TO CLINIC PRN.    Colt Quevedo DPM, FACFAS

## 2018-09-07 NOTE — MR AVS SNAPSHOT
After Visit Summary   9/7/2018    Lexii Mendoza    MRN: [id number]           Patient Information     Date Of Birth          1980        Visit Information        Provider Department      9/7/2018 9:00 AM Colt Quevedo, DPM [facility]        Today's Diagnoses     Capsulitis of foot, left    -  1      Care Instructions    We wish you continued good healing. If you have any questions or concerns, please do not hesitate to contact us at [phone]    Please remember to call and schedule a follow up appointment if one was recommended at your earliest convenience.   PODIATRY CLINIC HOURS  TELEPHONE NUMBER    Dr. Colt VOSSPSHAWN FAC FAS    Clinics:  [facility]    Marilyn Vincent [facility]   Tuesday 1PM-6PM  [place]  Wednesday 7AM-2PM  [place]  Thursday 10AM-6PM  [place] Friday 7AM-3PM  [place]  Specialty schedulers:   [phone] to make an appointment with any Specialty Provider.        Urgent Care locations:    [facility] Monday-Friday 5 pm - 9 pm. Saturday-Sunday 9 am -5pm    Monday-Friday 11 am - 9 pm Saturday 9 am - 5 pm     Monday-Sunday 12 noon-8PM [phone]     If you need a medication refill, please contact us you may need lab work and/or a follow up visit prior to your refill (i.e. Antifungal medications).    GeneExcel (secure e-mail communication and access to your chart) to send a message or to make an appointment.    If MRI needed please call Kevan Guzman at [phone]      Body mass index is 19.69 kg/(m^2).                Follow-ups after your visit        Who to contact     If you have questions or need follow up information about today's clinic visit or your schedule please contact [facility] directly at [phone].  Normal or non-critical lab and imaging results will be  communicated to you by CIVICOhart, letter or phone within 4 business days after the clinic has received the results. If you do not hear from us within 7 days, please contact the clinic through ICRTec or phone. If you have a critical or abnormal lab result, we will notify you by phone as soon as possible.  Submit refill requests through ICRTec or call your pharmacy and they will forward the refill request to us. Please allow 3 business days for your refill to be completed.          Additional Information About Your Visit        ICRTec Information     ICRTec gives you secure access to your electronic health record. If you see a primary care provider, you can also send messages to your care team and make appointments. If you have questions, please call your primary care clinic.  If you do not have a primary care provider, please call [phone] and they will assist you.        Care EveryWhere ID     This is your Care EveryWhere ID. This could be used by other organizations to access your [place] medical records  [id number]        Your Vitals Were     Pulse BMI (Body Mass Index)                60 19.69 kg/m2           Blood Pressure from Last 3 Encounters:   09/07/18 120/70   03/09/18 105/67   04/18/16 114/70    Weight from Last 3 Encounters:   09/07/18 122 lb (55.3 kg)   03/09/18 118 lb 9.6 oz (53.8 kg)   04/18/16 117 lb 9.6 oz (53.3 kg)               Primary Care Provider Office Phone # Fax #    Zoila GENNA Harden [phone]       [address] [facility] [address]  [facility] [address]        Equal Access to Services     [facility]: Hadii aad ku hadasho Soomaali, waaxda luqadaha, qaybta kaalmada adeegraymond, jayla michele . So [facility] [phone].    ATENCIÓN: Si habla español, tiene a callahan disposición servicios gratuitos de asistencia lingüística. Llame al [phone].    We comply with applicable federal civil rights laws and [place] laws. We do not discriminate on the basis of  race, color, national origin, age, disability, sex, sexual orientation, or gender identity.            Thank you!     Thank you for choosing [facility]  for your care. Our goal is always to provide you with excellent care. Hearing back from our patients is one way we can continue to improve our services. Please take a few minutes to complete the written survey that you may receive in the mail after your visit with us. Thank you!             Your Updated Medication List - Protect others around you: Learn how to safely use, store and throw away your medicines at www.disposemymeds.org.          This list is accurate as of 9/7/18 11:06 AM.  Always use your most recent med list.                   Brand Name Dispense Instructions for use Diagnosis    adapalene 0.1 % cream    DIFFERIN    45 g    To face at bedtime    Acne vulgaris       ENBREL SC      Inject 50 mg Subcutaneous once a week        FOLIC ACID PO      Take 1 mg by mouth daily        IBUPROFEN PO      Take 400 mg by mouth        levonorgestrel 20 MCG/24HR IUD    MIRENA    1 each    1 each (20 mcg) by Intrauterine route once for 1 dose    Encounter for IUD removal and reinsertion       MELOXICAM PO      Take 150 mg by mouth daily        METHOTREXATE PO      Take 2.5 mg by mouth once a week        MULTIVITAMIN ADULTS PO           PRILOSEC OTC PO           triamcinolone 0.1 % ointment    KENALOG    80 g    To rash areas twice daily as needed.    Dermatitis

## 2018-09-07 NOTE — PATIENT INSTRUCTIONS
We wish you continued good healing. If you have any questions or concerns, please do not hesitate to contact us at [phone]    Please remember to call and schedule a follow up appointment if one was recommended at your earliest convenience.   PODIATRY CLINIC HOURS  TELEPHONE NUMBER    Dr. Colt Quevedo D.P.M [facility]    Clinics:  [facility]    Marilyn Vincent [facility]   Tuesday 1PM-6PM  [place]  Wednesday 7AM-2PM  [facility]  Thursday 10AM-6PM  [place]  Friday 7AM-3PM  [place]  Specialty schedulers:   [phone] to make an appointment with any Specialty Provider.        Urgent Care locations:    [facility] Monday-Friday 5 pm - 9 pm. Saturday-Sunday 9 am -5pm    Monday-Friday 11 am - 9 pm Saturday 9 am - 5 pm     Monday-Sunday 12 noon-8PM [phone]     If you need a medication refill, please contact us you may need lab work and/or a follow up visit prior to your refill (i.e. Antifungal medications).    Critical Linkst (secure e-mail communication and access to your chart) to send a message or to make an appointment.    If MRI needed please call Kevan Hinds at [phone]      Body mass index is 19.69 kg/(m^2).

## 2018-09-10 NOTE — TELEPHONE ENCOUNTER
Prior Authorization Approval    Authorization Effective Date: 9/7/2018  Authorization Expiration Date: 9/7/2019  Medication: Tacrolimus 0.1% ointment - approved  Approved Dose/Quantity:   Reference #: [id number]   Insurance Company: [employer] - Phone [phone] Fax [phone]  Expected CoPay: n/a     CoPay Card Available:      Foundation Assistance Needed:    Which Pharmacy is filling the prescription (Not needed for infusion/clinic administered): [id number] - [place], [place] - [address] & [facility]  Pharmacy Notified: Yes  Patient Notified: Yes

## 2018-10-15 ENCOUNTER — TRANSFERRED RECORDS (OUTPATIENT)
Dept: HEALTH INFORMATION MANAGEMENT | Facility: CLINIC | Age: 38
End: 2018-10-15

## 2018-10-31 ENCOUNTER — OFFICE VISIT (OUTPATIENT)
Dept: DERMATOLOGY | Facility: CLINIC | Age: 38
End: 2018-10-31
Attending: DERMATOLOGY
Payer: COMMERCIAL

## 2018-10-31 DIAGNOSIS — D69.2 PURPURA (H): Primary | ICD-10-CM

## 2018-10-31 LAB
ALBUMIN UR-MCNC: NEGATIVE MG/DL
APPEARANCE UR: CLEAR
APTT PPP: 29 SEC (ref 22–37)
BASOPHILS # BLD AUTO: 0 10E9/L (ref 0–0.2)
BASOPHILS NFR BLD AUTO: 0.4 %
BILIRUB UR QL STRIP: NEGATIVE
COLOR UR AUTO: ABNORMAL
CRP SERPL-MCNC: <2.9 MG/L (ref 0–8)
D DIMER PPP FEU-MCNC: <0.3 UG/ML FEU (ref 0–0.5)
DIFFERENTIAL METHOD BLD: NORMAL
EOSINOPHIL # BLD AUTO: 0.1 10E9/L (ref 0–0.7)
EOSINOPHIL NFR BLD AUTO: 1 %
ERYTHROCYTE [DISTWIDTH] IN BLOOD BY AUTOMATED COUNT: 13.1 % (ref 10–15)
ERYTHROCYTE [SEDIMENTATION RATE] IN BLOOD BY WESTERGREN METHOD: 7 MM/H (ref 0–20)
FIBRINOGEN PPP-MCNC: 267 MG/DL (ref 200–420)
GLUCOSE UR STRIP-MCNC: NEGATIVE MG/DL
HCT VFR BLD AUTO: 41 % (ref 35–47)
HGB BLD-MCNC: 13.7 G/DL (ref 11.7–15.7)
HGB UR QL STRIP: NEGATIVE
IMM GRANULOCYTES # BLD: 0 10E9/L (ref 0–0.4)
IMM GRANULOCYTES NFR BLD: 0 %
INR PPP: 1.09 (ref 0.86–1.14)
KETONES UR STRIP-MCNC: NEGATIVE MG/DL
LEUKOCYTE ESTERASE UR QL STRIP: NEGATIVE
LYMPHOCYTES # BLD AUTO: 2.2 10E9/L (ref 0.8–5.3)
LYMPHOCYTES NFR BLD AUTO: 46.6 %
MCH RBC QN AUTO: 32.2 PG (ref 26.5–33)
MCHC RBC AUTO-ENTMCNC: 33.4 G/DL (ref 31.5–36.5)
MCV RBC AUTO: 96 FL (ref 78–100)
MISCELLANEOUS TEST: NORMAL
MONOCYTES # BLD AUTO: 0.3 10E9/L (ref 0–1.3)
MONOCYTES NFR BLD AUTO: 6.3 %
MUCOUS THREADS #/AREA URNS LPF: PRESENT /LPF
NEUTROPHILS # BLD AUTO: 2.2 10E9/L (ref 1.6–8.3)
NEUTROPHILS NFR BLD AUTO: 45.7 %
NITRATE UR QL: NEGATIVE
NRBC # BLD AUTO: 0 10*3/UL
NRBC BLD AUTO-RTO: 0 /100
PH UR STRIP: 5.5 PH (ref 5–7)
PLATELET # BLD AUTO: 208 10E9/L (ref 150–450)
RBC # BLD AUTO: 4.26 10E12/L (ref 3.8–5.2)
RBC #/AREA URNS AUTO: 1 /HPF (ref 0–2)
SOURCE: ABNORMAL
SP GR UR STRIP: 1.01 (ref 1–1.03)
SQUAMOUS #/AREA URNS AUTO: 1 /HPF (ref 0–1)
UROBILINOGEN UR STRIP-MCNC: NORMAL MG/DL (ref 0–2)
WBC # BLD AUTO: 4.8 10E9/L (ref 4–11)
WBC #/AREA URNS AUTO: 1 /HPF (ref 0–5)

## 2018-10-31 PROCEDURE — 82784 ASSAY IGA/IGD/IGG/IGM EACH: CPT | Performed by: DERMATOLOGY

## 2018-10-31 PROCEDURE — 85384 FIBRINOGEN ACTIVITY: CPT | Performed by: DERMATOLOGY

## 2018-10-31 PROCEDURE — 81001 URINALYSIS AUTO W/SCOPE: CPT | Performed by: DERMATOLOGY

## 2018-10-31 PROCEDURE — 86160 COMPLEMENT ANTIGEN: CPT | Performed by: DERMATOLOGY

## 2018-10-31 PROCEDURE — 85652 RBC SED RATE AUTOMATED: CPT | Performed by: DERMATOLOGY

## 2018-10-31 PROCEDURE — 82595 ASSAY OF CRYOGLOBULIN: CPT | Performed by: DERMATOLOGY

## 2018-10-31 PROCEDURE — 86431 RHEUMATOID FACTOR QUANT: CPT | Performed by: DERMATOLOGY

## 2018-10-31 PROCEDURE — 84999 UNLISTED CHEMISTRY PROCEDURE: CPT | Performed by: DERMATOLOGY

## 2018-10-31 PROCEDURE — 85379 FIBRIN DEGRADATION QUANT: CPT | Performed by: DERMATOLOGY

## 2018-10-31 PROCEDURE — 85025 COMPLETE CBC W/AUTO DIFF WBC: CPT | Performed by: DERMATOLOGY

## 2018-10-31 PROCEDURE — 86140 C-REACTIVE PROTEIN: CPT | Performed by: DERMATOLOGY

## 2018-10-31 PROCEDURE — 82787 IGG 1 2 3 OR 4 EACH: CPT | Performed by: DERMATOLOGY

## 2018-10-31 PROCEDURE — 82585 ASSAY OF CRYOFIBRINOGEN: CPT | Performed by: DERMATOLOGY

## 2018-10-31 PROCEDURE — 36415 COLL VENOUS BLD VENIPUNCTURE: CPT | Performed by: DERMATOLOGY

## 2018-10-31 PROCEDURE — 84165 PROTEIN E-PHORESIS SERUM: CPT | Performed by: DERMATOLOGY

## 2018-10-31 PROCEDURE — 83516 IMMUNOASSAY NONANTIBODY: CPT | Performed by: DERMATOLOGY

## 2018-10-31 PROCEDURE — 00000402 ZZHCL STATISTIC TOTAL PROTEIN: Performed by: DERMATOLOGY

## 2018-10-31 PROCEDURE — 85730 THROMBOPLASTIN TIME PARTIAL: CPT | Performed by: DERMATOLOGY

## 2018-10-31 PROCEDURE — 85610 PROTHROMBIN TIME: CPT | Performed by: DERMATOLOGY

## 2018-10-31 NOTE — LETTER
"  10/31/2018      RE: Lexii Mendoza  [address] [place] [address]                           Dermatology Clinic Note    Dermatology Problem List:  1. Purpuric patches on fingers, induced by minimal trauma  2. Joint pain and swelling, yet to be defined diagnosis. Joint involvement in hands, neck.  Normal/negative inflammatory markers, KADIE, RF, complement, Lyme serologies, LORA panel.   On enbrel, methotrexate, previously meloxicam, plaquenil, sulfasalazine  3. 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 Acne vulgaris- adapalene  5. DSAP, arms  6. Granuloma annulare- biopsy proven      CC: purpura    HPI: Lexii Mendoza is a 38 year old female presenting for evaluation of recurrent purpura of the fingers/hands. The first episode occurred in 12/17 and involved the L thumb at the dorsal MCP. The second episode was in 5/18 (PIP of the R 3rd finger), and most recent episode 10/27/18 (L palmar proximal thumb). In each case there was minimal preceding trauma. The most recent episode happened after picking up a pot off the counter. In all cases there was an initial sharp pain with a \"popping\" sensation and subsequent bruising that resolved slowly. The most recent episode was also associated with numbness in the associated finger that lasted for a day. No other numbness, weakness, tingling. No history of easy bleeding, epistaxis, gum bleeding, hemarthroses. No use of ASA or other anticoagulants. Had been on meloxicam during initial episodes, but not recently. While taking sulfasalazine patient reports a macular purpuric eruption on the lower leg, but this resolved after stopping the medication.       Patient Active Problem List   Diagnosis     NO ACTIVE PROBLEMS     Encounter for IUD removal and reinsertion       Allergies   Allergen Reactions     Sulfasalazine Rash     Rash and elevated LFT         Current Outpatient Prescriptions   Medication     adapalene (DIFFERIN) 0.1 % cream     Etanercept (ENBREL SC) "     FOLIC ACID PO     IBUPROFEN PO     levonorgestrel (MIRENA) 20 MCG/24HR IUD     MELOXICAM PO     METHOTREXATE PO     Multiple Vitamins-Minerals (MULTIVITAMIN ADULTS PO)     Omeprazole Magnesium (PRILOSEC OTC PO)     triamcinolone (KENALOG) 0.1 % ointment     No current facility-administered medications for this visit.        Family History   Problem Relation Age of Onset     Family History Negative Other        Social History     Social History     Marital status:      Spouse name: N/A     Number of children: N/A     Years of education: N/A     Occupational History     Not on file.     Social History Main Topics     Smoking status: Never Smoker     Smokeless tobacco: Never Used     Alcohol use 1.2 oz/week     2 Standard drinks or equivalent per week     Drug use: No     Sexual activity: Yes     Partners: Male     Birth control/ protection: IUD     Other Topics Concern     Not on file     Social History Narrative         ROS: Feeling well without other skin concerns.     EXAM:  There were no vitals taken for this visit.  GEN: Alert, no distress  HEENT: Conjunctiva clear.   PULM: Breathing comfortably on RA  CV: Extrem warm and well perfused  ABD: No distension  SKIN: Exam of hands (photos reviewed)  --Purpuric patch on the L proximal palmar thub    Assessment and Plan:  37 y/o F with history of joint pain and swelling in the hands, neck, capillary loop changes, and now recurrent episodes of sudden pain and subsequent purpura/brusing of the fingers secondary to minimal trauma.     There is not yet a unifying rheumatologic diagnosis to explain symptoms as lab parameters have been overall within normal limits. DDx for the purpura on the hands includes hypergammaglubulinemic purpura of Waldenstrom, flexor tendon injuries due to underlying enthesitis. Coagulation disorders are also considered given the extent of bruising due to minor trauma. Lab evaluation will be completed today as noted below.     - CBC with  platelets differential  - Erythrocyte sedimentation rate auto  - CRP inflammation  - Cryoglobulin quantitative  - Immunoglobulin G subclasses  - Rheumatoid factor  - Protein electrophoresis  - Complement C3  - Complement C4  - [facility] send out [id number] (immunoglobulin for RF): Laboratory Miscellaneous Order  - Cryofibrinogen  - IgM  - Routine UA with micro reflex to culture  - INR  - Partial thromboplastin time  - Fibrinogen activity  - D dimer quantitative  - [facility] Miscellaneous Test      Thank you for involving me in this patient's care.     Syeda Rowe MD  Dermatology Staff    CC: Zoila Harden MD  [address]  [place] [address]    Zoila Harden MD

## 2018-10-31 NOTE — MR AVS SNAPSHOT
After Visit Summary   10/31/2018    Lexii Mendoza    MRN: [id number]           Patient Information     Date Of Birth          1980        Visit Information        Provider Department      10/31/2018 11:15 AM Syeda Rowe MD Peds Dermatology        Today's Diagnoses     Purpura (H)    -  1       Follow-ups after your visit        Your next 10 appointments already scheduled     Dec 14, 2018 10:30 AM CST   (Arrive by 10:15 AM)   New Patient Visit with Victorina Garcia MD   [facility]    [address] [place] [address] [phone]              Who to contact     Please call your clinic at [phone] to:    Ask questions about your health    Make or cancel appointments    Discuss your medicines    Learn about your test results    Speak to your doctor            Additional Information About Your Visit        MyChart Information     [id number] outdoorst gives you secure access to your electronic health record. If you see a primary care provider, you can also send messages to your care team and make appointments. If you have questions, please call your primary care clinic.  If you do not have a primary care provider, please call [phone] and they will assist you.      [facility] [employer] is an electronic gateway that provides easy, online access to your medical records. With [facility], you can request a clinic appointment, read your test results, renew a prescription or communicate with your care team.     To access your existing account, please contact your [facility] or call [phone] for assistance.        Care EveryWhere ID     This is your Care EveryWhere ID. This could be used by other organizations to access your [place] medical records  [id number]         Blood Pressure from Last 3 Encounters:   09/07/18 120/70   03/09/18 105/67   04/18/16 114/70    Weight from Last 3  Encounters:   09/07/18 122 lb (55.3 kg)   03/09/18 118 lb 9.6 oz (53.8 kg)   04/18/16 117 lb 9.6 oz (53.3 kg)              We Performed the Following     [facility] Miscellaneous Test     [facility] send out [id number] (immunoglobulin for RF): Laboratory Miscellaneous Order     CBC with platelets differential     Complement C3     Complement C4     CRP inflammation     Cryofibrinogen     Cryoglobulin quantitative     D dimer quantitative     Erythrocyte sedimentation rate auto     Fibrinogen activity     IgM     Immunoglobulin G subclasses     INR     Partial thromboplastin time     Protein electrophoresis     Rheumatoid factor     Routine UA with micro reflex to culture        Primary Care Provider Office Phone # Fax #    Zoila Harden [phone]       [address] [facility] [address]  [facility] [address]        Equal Access to Services     LUPE EMANUEL : Hadii aad ku hadasho Soomaali, waaxda luqadaha, qaybta kaalmada adeegyada, waxay idiin callie michele . So [facility] [phone].    ATENCIÓN: Si habla español, tiene a callahan disposición servicios gratuitos de asistencia lingüística. Llame al [phone].    We comply with applicable federal civil rights laws and [place] laws. We do not discriminate on the basis of race, color, national origin, age, disability, sex, sexual orientation, or gender identity.            Thank you!     [facility]  for your care. Our goal is always to provide you with excellent care. Hearing back from our patients is one way we can continue to improve our services. Please take a few minutes to complete the written survey that you may receive in the mail after your visit with us. Thank you!             Your Updated Medication List - Protect others around you: Learn how to safely use, store and throw away your medicines at www.disposemymeds.org.          This list is accurate as of 10/31/18 11:59 PM.  Always use your most recent med list.                    Brand Name Dispense Instructions for use Diagnosis    adapalene 0.1 % cream    DIFFERIN    45 g    To face at bedtime    Acne vulgaris       ENBREL SC      Inject 50 mg Subcutaneous once a week        FOLIC ACID PO      Take 1 mg by mouth daily        IBUPROFEN PO      Take 400 mg by mouth        levonorgestrel 20 MCG/24HR IUD    MIRENA    1 each    1 each (20 mcg) by Intrauterine route once for 1 dose    Encounter for IUD removal and reinsertion       MELOXICAM PO      Take 150 mg by mouth daily        METHOTREXATE PO      Take 2.5 mg by mouth once a week        MULTIVITAMIN ADULTS PO           PRILOSEC OTC PO           triamcinolone 0.1 % ointment    KENALOG    80 g    To rash areas twice daily as needed.    Dermatitis

## 2018-10-31 NOTE — PROGRESS NOTES
"                    Dermatology Clinic Note    Dermatology Problem List:  1. Purpuric patches on fingers, induced by minimal trauma  2. Joint pain and swelling, yet to be defined diagnosis. Joint involvement in hands, neck.  Normal/negative inflammatory markers, KADIE, RF, complement, Lyme serologies, LORA panel.   On enbrel, methotrexate, previously meloxicam, plaquenil, sulfasalazine  3. 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 Acne vulgaris- adapalene  5. DSAP, arms  6. Granuloma annulare- biopsy proven      CC: purpura    HPI: Lexii Mendoza is a 38 year old female presenting for evaluation of recurrent purpura of the fingers/hands. The first episode occurred in 12/17 and involved the L thumb at the dorsal MCP. The second episode was in 5/18 (PIP of the R 3rd finger), and most recent episode 10/27/18 (L palmar proximal thumb). In each case there was minimal preceding trauma. The most recent episode happened after picking up a pot off the counter. In all cases there was an initial sharp pain with a \"popping\" sensation and subsequent bruising that resolved slowly. The most recent episode was also associated with numbness in the associated finger that lasted for a day. No other numbness, weakness, tingling. No history of easy bleeding, epistaxis, gum bleeding, hemarthroses. No use of ASA or other anticoagulants. Had been on meloxicam during initial episodes, but not recently. While taking sulfasalazine patient reports a macular purpuric eruption on the lower leg, but this resolved after stopping the medication.       Patient Active Problem List   Diagnosis     NO ACTIVE PROBLEMS     Encounter for IUD removal and reinsertion       Allergies   Allergen Reactions     Sulfasalazine Rash     Rash and elevated LFT         Current Outpatient Prescriptions   Medication     adapalene (DIFFERIN) 0.1 % cream     Etanercept (ENBREL SC)     FOLIC ACID PO     IBUPROFEN PO     levonorgestrel (MIRENA) 20 MCG/24HR IUD     " MELOXICAM PO     METHOTREXATE PO     Multiple Vitamins-Minerals (MULTIVITAMIN ADULTS PO)     Omeprazole Magnesium (PRILOSEC OTC PO)     triamcinolone (KENALOG) 0.1 % ointment     No current facility-administered medications for this visit.        Family History   Problem Relation Age of Onset     Family History Negative Other        Social History     Social History     Marital status:      Spouse name: N/A     Number of children: N/A     Years of education: N/A     Occupational History     Not on file.     Social History Main Topics     Smoking status: Never Smoker     Smokeless tobacco: Never Used     Alcohol use 1.2 oz/week     2 Standard drinks or equivalent per week     Drug use: No     Sexual activity: Yes     Partners: Male     Birth control/ protection: IUD     Other Topics Concern     Not on file     Social History Narrative         ROS: Feeling well without other skin concerns.     EXAM:  There were no vitals taken for this visit.  GEN: Alert, no distress  HEENT: Conjunctiva clear.   PULM: Breathing comfortably on RA  CV: Extrem warm and well perfused  ABD: No distension  SKIN: Exam of hands (photos reviewed)  --Purpuric patch on the L proximal palmar thub    Assessment and Plan:  39 y/o F with history of joint pain and swelling in the hands, neck, capillary loop changes, and now recurrent episodes of sudden pain and subsequent purpura/brusing of the fingers secondary to minimal trauma.     There is not yet a unifying rheumatologic diagnosis to explain symptoms as lab parameters have been overall within normal limits. DDx for the purpura on the hands includes hypergammaglubulinemic purpura of Waldenstrom, flexor tendon injuries due to underlying enthesitis. Coagulation disorders are also considered given the extent of bruising due to minor trauma. Lab evaluation will be completed today as noted below.     - CBC with platelets differential  - Erythrocyte sedimentation rate auto  - CRP inflammation  -  Cryoglobulin quantitative  - Immunoglobulin G subclasses  - Rheumatoid factor  - Protein electrophoresis  - Complement C3  - Complement C4  - [facility] send out [id number] (immunoglobulin for RF): Laboratory Miscellaneous Order  - Cryofibrinogen  - IgM  - Routine UA with micro reflex to culture  - INR  - Partial thromboplastin time  - Fibrinogen activity  - D dimer quantitative  - [facility] Miscellaneous Test      Thank you for involving me in this patient's care.     Syeda Rowe MD  Dermatology Staff    CC: Zoila Harden MD  [address] [place], [place] [address] [address] [address]    Zoila Harden MD

## 2018-11-01 LAB
ALBUMIN SERPL ELPH-MCNC: 4.9 G/DL (ref 3.7–5.1)
ALPHA1 GLOB SERPL ELPH-MCNC: 0.3 G/DL (ref 0.2–0.4)
ALPHA2 GLOB SERPL ELPH-MCNC: 0.7 G/DL (ref 0.5–0.9)
B-GLOBULIN SERPL ELPH-MCNC: 0.8 G/DL (ref 0.6–1)
C3 SERPL-MCNC: 72 MG/DL (ref 76–169)
C4 SERPL-MCNC: 14 MG/DL (ref 15–50)
GAMMA GLOB SERPL ELPH-MCNC: 1.2 G/DL (ref 0.7–1.6)
IGM SERPL-MCNC: 129 MG/DL (ref 60–265)
M PROTEIN SERPL ELPH-MCNC: 0 G/DL
PROT PATTERN SERPL ELPH-IMP: NORMAL
RHEUMATOID FACT SER NEPH-ACNC: <20 IU/ML (ref 0–20)

## 2018-11-02 LAB
IGG SERPL-MCNC: 1170 MG/DL (ref 695–1620)
IGG1 SER-MCNC: 694 MG/DL (ref 300–856)
IGG2 SER-MCNC: 241 MG/DL (ref 158–761)
IGG3 SER-MCNC: 32 MG/DL (ref 24–192)
IGG4 SER-MCNC: 12 MG/DL (ref 11–86)
RESULT: NORMAL
SEND OUTS MISC TEST CODE: NORMAL
SEND OUTS MISC TEST SPECIMEN: NORMAL
TEST NAME: NORMAL

## 2018-11-05 LAB — CRYOFIB PLAS QL 3D INC: NORMAL

## 2018-11-06 LAB — CRYOGLOB SER QL: NEGATIVE %

## 2018-12-14 ENCOUNTER — OFFICE VISIT (OUTPATIENT)
Dept: INTERNAL MEDICINE | Facility: CLINIC | Age: 38
End: 2018-12-14
Payer: COMMERCIAL

## 2018-12-14 VITALS
SYSTOLIC BLOOD PRESSURE: 117 MMHG | DIASTOLIC BLOOD PRESSURE: 67 MMHG | BODY MASS INDEX: 19.38 KG/M2 | HEIGHT: 66 IN | HEART RATE: 67 BPM | RESPIRATION RATE: 16 BRPM | WEIGHT: 120.6 LBS

## 2018-12-14 DIAGNOSIS — R06.02 SHORTNESS OF BREATH: ICD-10-CM

## 2018-12-14 DIAGNOSIS — Z00.00 ROUTINE HISTORY AND PHYSICAL EXAMINATION OF ADULT: Primary | ICD-10-CM

## 2018-12-14 ASSESSMENT — ENCOUNTER SYMPTOMS
HYPOTENSION: 0
BACK PAIN: 0
HYPERTENSION: 0
SKIN CHANGES: 0
MUSCLE WEAKNESS: 0
JOINT SWELLING: 0
SYNCOPE: 0
STIFFNESS: 0
ARTHRALGIAS: 1
ORTHOPNEA: 0
LIGHT-HEADEDNESS: 0
EXERCISE INTOLERANCE: 1
PALPITATIONS: 1
NAIL CHANGES: 0
NECK PAIN: 0
MYALGIAS: 0
LEG PAIN: 0
POOR WOUND HEALING: 0
SLEEP DISTURBANCES DUE TO BREATHING: 0
MUSCLE CRAMPS: 0

## 2018-12-14 ASSESSMENT — PAIN SCALES - GENERAL: PAINLEVEL: NO PAIN (0)

## 2018-12-14 ASSESSMENT — MIFFLIN-ST. JEOR: SCORE: 1235.41

## 2018-12-14 NOTE — NURSING NOTE
Chief Complaint   Patient presents with     Establish Care     pt here to establish care       Veronica Singh CMA at 10:33 AM on 12/14/2018.

## 2018-12-14 NOTE — PATIENT INSTRUCTIONS
[facility] Medication Refill Request Information:  * Please contact your pharmacy regarding ANY request for medication refills.  ** [facility] Prescription Fax = [phone]  * Please allow 3 business days for routine medication refills.  * Please allow 5 business days for controlled substance medication refills.     [facility] Test Result notification information:  *You will be notified with in 7-10 days of your appointment day regarding the results of your test.  If you are on MyChart you will be notified as soon as the provider has reviewed the results and signed off on them.    [facility]: [phone]

## 2018-12-17 ENCOUNTER — OFFICE VISIT (OUTPATIENT)
Dept: DERMATOLOGY | Facility: CLINIC | Age: 38
End: 2018-12-17
Attending: DERMATOLOGY
Payer: COMMERCIAL

## 2018-12-17 DIAGNOSIS — M79.10 MYALGIA: ICD-10-CM

## 2018-12-17 DIAGNOSIS — D48.5 NEOPLASM OF UNCERTAIN BEHAVIOR OF SKIN: Primary | ICD-10-CM

## 2018-12-17 DIAGNOSIS — M19.90 ARTHRITIS: Primary | ICD-10-CM

## 2018-12-17 PROCEDURE — 11100 HC BIOPSY SKIN/SUBQ/MUC MEM, SINGLE LESION: CPT | Mod: ZF | Performed by: DERMATOLOGY

## 2018-12-17 PROCEDURE — 88305 TISSUE EXAM BY PATHOLOGIST: CPT | Mod: TC | Performed by: DERMATOLOGY

## 2018-12-17 NOTE — PROGRESS NOTES
Dermatology Clinic Note    Dermatology Problem List:  1. Purpuric patches on fingers, induced by minimal trauma  2. Joint pain and swelling, yet to be defined diagnosis. Joint involvement in hands, neck.  Normal/negative inflammatory markers, KADIE, RF, complement, Lyme serologies, LORA panel.   On enbrel, methotrexate (slow wean currently 17.5 mg /week), previously meloxicam, plaquenil, sulfasalazine  3. 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 Acne vulgaris- adapalene  5. DSAP, arms  6. Granuloma annulare- biopsy proven      CC: papules on elbows    HPI: Lexii Mendoza is a 38 year old female presenting for evaluation of papules on the bilateral elbows in the setting of inflammatory arthritis. She notes that the papules have been increasing over the last week with decrease in methotrexate dosing. She also notes chronic nausea with the wean in the methotrexate. No fevers, diarrhea, or vomiting. No increased joint symptoms.       Patient Active Problem List   Diagnosis     NO ACTIVE PROBLEMS     Encounter for IUD removal and reinsertion       Allergies   Allergen Reactions     Sulfasalazine Rash     Rash and elevated LFT         Current Outpatient Medications   Medication     adapalene (DIFFERIN) 0.1 % cream     Etanercept (ENBREL SC)     FOLIC ACID PO     IBUPROFEN PO     levonorgestrel (MIRENA) 20 MCG/24HR IUD     MELOXICAM PO     METHOTREXATE PO     Multiple Vitamins-Minerals (MULTIVITAMIN ADULTS PO)     Omeprazole Magnesium (PRILOSEC OTC PO)     triamcinolone (KENALOG) 0.1 % ointment     No current facility-administered medications for this visit.        Family History   Problem Relation Age of Onset     Family History Negative Other        Social History     Socioeconomic History     Marital status:      Spouse name: Not on file     Number of children: Not on file     Years of education: Not on file     Highest education level: Not on file   Social Needs     Financial resource strain:  Not on file     Food insecurity - worry: Not on file     Food insecurity - inability: Not on file     Transportation needs - medical: Not on file     Transportation needs - non-medical: Not on file   Occupational History     Not on file   Tobacco Use     Smoking status: Never Smoker     Smokeless tobacco: Never Used   Substance and Sexual Activity     Alcohol use: Yes     Alcohol/week: 1.2 oz     Types: 2 Standard drinks or equivalent per week     Drug use: No     Sexual activity: Yes     Partners: Male     Birth control/protection: IUD   Other Topics Concern     Not on file   Social History Narrative     Not on file         ROS: As in hpi.     EXAM:  There were no vitals taken for this visit.  GEN: Alert, no distress  HEENT: Conjunctiva clear.   PULM: Breathing comfortably on RA  CV: Extrem warm and well perfused  ABD: No distension  SKIN: Exam of elbows  -Pink 2-3 mm subcutaneous papules with mild fine scale on the extensor elbows, lesions more numerous on the L elbow compared to R. Pink papules on the extensor L forearm.     Procedure Note:  Punch biopsy:  After discussion of benefits and risks including but not limited to bleeding/bruising, pain/swelling, infection, scar, incomplete removal, nerve damage/numbness, recurrence, and non-diagnostic biopsy, written consent was obtained. The area was cleaned with isopropyl alcohol. 1 mL of 1% lidocaine with epinephrine was injected to obtain adequate anesthesia of the lesion on the L elbow. A 4 mm punch biopsy was performed.  4-0 prolene sutures were utilized to approximate the epidermal edges.  White petroleum jelly/VaselineTM and a bandage was applied to the wound.  Explicit verbal and written wound care instructions were provided.  The patient left the Dermatology Clinic in good condition. The patient was counseled to follow up for suture removal in approximately 10 days.        Assessment and Plan:  37 y/o F with history of joint pain and swelling in the hands,  neck, capillary loop changes, recurrent episodes of purpura/brusing of the fingers secondary to minimal trauma, papules on the elbows, and worsening nausea.     There is not yet a unifying rheumatologic diagnosis to explain symptoms as lab parameters have been overall within normal limits apart from a mildly low C3/4 at last check on 10/31. Biopsy today of elbow papule. Differential diagnosis includes granuloma anulare, Gottron's papules, interstitial granulomatous dermatitis, less likely psoriasis given minimal scale.     Will plan to repeat c3/4 levels with methotrexate labs this week.       Syeda Rowe MD  Dermatology Staff    CC: Zoila Harden MD  [address]  [place] [address]    Ziola Harden MD

## 2018-12-17 NOTE — NURSING NOTE
Pause for the cause has been completed prior to 4mm punch biopsy.   1. Lexii was identified by both name and date of birth -  YES.   2. The correct site was identified -  YES.   3. Site marked by provider - YES.   4. Written informed consent correct and signed or verbal authorization  to proceed is obtained -  YES.   5. Verify necessary supplies, equipment, and diagnostics are available -  YES.   6. Time out is performed immediately prior to procedure -  YES.      Carmencita Rod, [facility]

## 2018-12-17 NOTE — LETTER
12/17/2018      RE: Lexii Mendoza  [address] [place] [address]                 Dermatology Clinic Note    Dermatology Problem List:  1. Purpuric patches on fingers, induced by minimal trauma  2. Joint pain and swelling, yet to be defined diagnosis. Joint involvement in hands, neck.  Normal/negative inflammatory markers, KADIE, RF, complement, Lyme serologies, LORA panel.   On enbrel, methotrexate (slow wean currently 17.5 mg /week), previously meloxicam, plaquenil, sulfasalazine  3. 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 Acne vulgaris- adapalene  5. DSAP, arms  6. Granuloma annulare- biopsy proven      CC: papules on elbows    HPI: Lexii Mendoza is a 38 year old female presenting for evaluation of papules on the bilateral elbows in the setting of inflammatory arthritis. She notes that the papules have been increasing over the last week with decrease in methotrexate dosing. She also notes chronic nausea with the wean in the methotrexate. No fevers, diarrhea, or vomiting. No increased joint symptoms.       Patient Active Problem List   Diagnosis     NO ACTIVE PROBLEMS     Encounter for IUD removal and reinsertion       Allergies   Allergen Reactions     Sulfasalazine Rash     Rash and elevated LFT         Current Outpatient Medications   Medication     adapalene (DIFFERIN) 0.1 % cream     Etanercept (ENBREL SC)     FOLIC ACID PO     IBUPROFEN PO     levonorgestrel (MIRENA) 20 MCG/24HR IUD     MELOXICAM PO     METHOTREXATE PO     Multiple Vitamins-Minerals (MULTIVITAMIN ADULTS PO)     Omeprazole Magnesium (PRILOSEC OTC PO)     triamcinolone (KENALOG) 0.1 % ointment     No current facility-administered medications for this visit.        Family History   Problem Relation Age of Onset     Family History Negative Other        Social History     Socioeconomic History     Marital status:      Spouse name: Not on file     Number of children: Not on file     Years of education: Not on file      Highest education level: Not on file   Social Needs     Financial resource strain: Not on file     Food insecurity - worry: Not on file     Food insecurity - inability: Not on file     Transportation needs - medical: Not on file     Transportation needs - non-medical: Not on file   Occupational History     Not on file   Tobacco Use     Smoking status: Never Smoker     Smokeless tobacco: Never Used   Substance and Sexual Activity     Alcohol use: Yes     Alcohol/week: 1.2 oz     Types: 2 Standard drinks or equivalent per week     Drug use: No     Sexual activity: Yes     Partners: Male     Birth control/protection: IUD   Other Topics Concern     Not on file   Social History Narrative     Not on file         ROS: As in hpi.     EXAM:  There were no vitals taken for this visit.  GEN: Alert, no distress  HEENT: Conjunctiva clear.   PULM: Breathing comfortably on RA  CV: Extrem warm and well perfused  ABD: No distension  SKIN: Exam of elbows  -Pink 2-3 mm subcutaneous papules with mild fine scale on the extensor elbows, lesions more numerous on the L elbow compared to R. Pink papules on the extensor L forearm.     Procedure Note:  Punch biopsy:  After discussion of benefits and risks including but not limited to bleeding/bruising, pain/swelling, infection, scar, incomplete removal, nerve damage/numbness, recurrence, and non-diagnostic biopsy, written consent was obtained. The area was cleaned with isopropyl alcohol. 1 mL of 1% lidocaine with epinephrine was injected to obtain adequate anesthesia of the lesion on the L elbow. A 4 mm punch biopsy was performed.  4-0 prolene sutures were utilized to approximate the epidermal edges.  White petroleum jelly/VaselineTM and a bandage was applied to the wound.  Explicit verbal and written wound care instructions were provided.  The patient left the Dermatology Clinic in good condition. The patient was counseled to follow up for suture removal in approximately 10  days.        Assessment and Plan:  37 y/o F with history of joint pain and swelling in the hands, neck, capillary loop changes, recurrent episodes of purpura/brusing of the fingers secondary to minimal trauma, papules on the elbows, and worsening nausea.     There is not yet a unifying rheumatologic diagnosis to explain symptoms as lab parameters have been overall within normal limits apart from a mildly low C3/4 at last check on 10/31. Biopsy today of elbow papule. Differential diagnosis includes granuloma anulare, Gottron's papules, interstitial granulomatous dermatitis, less likely psoriasis given minimal scale.     Will plan to repeat c3/4 levels with methotrexate labs this week.       Syeda Rowe MD  Dermatology Staff    CC: Zoila Harden MD  [address]  [place] [address]    Zoila Harden MD

## 2018-12-18 DIAGNOSIS — M19.90 ARTHRITIS: ICD-10-CM

## 2018-12-18 DIAGNOSIS — Z79.899 LONG TERM USE OF DRUG: ICD-10-CM

## 2018-12-18 LAB
ALBUMIN SERPL-MCNC: 4.2 G/DL (ref 3.4–5)
ALP SERPL-CCNC: 54 U/L (ref 40–150)
ALT SERPL W P-5'-P-CCNC: 29 U/L (ref 0–50)
ANION GAP SERPL CALCULATED.3IONS-SCNC: 7 MMOL/L (ref 3–14)
AST SERPL W P-5'-P-CCNC: 24 U/L (ref 0–45)
BASOPHILS # BLD AUTO: 0 10E9/L (ref 0–0.2)
BASOPHILS NFR BLD AUTO: 0.2 %
BILIRUB DIRECT SERPL-MCNC: 0.2 MG/DL (ref 0–0.2)
BILIRUB SERPL-MCNC: 0.9 MG/DL (ref 0.2–1.3)
BUN SERPL-MCNC: 19 MG/DL (ref 7–30)
C3 SERPL-MCNC: 62 MG/DL (ref 76–169)
C4 SERPL-MCNC: 12 MG/DL (ref 15–50)
CALCIUM SERPL-MCNC: 9 MG/DL (ref 8.5–10.1)
CHLORIDE SERPL-SCNC: 108 MMOL/L (ref 94–109)
CO2 SERPL-SCNC: 24 MMOL/L (ref 20–32)
CREAT SERPL-MCNC: 0.77 MG/DL (ref 0.52–1.04)
DIFFERENTIAL METHOD BLD: NORMAL
EOSINOPHIL # BLD AUTO: 0.1 10E9/L (ref 0–0.7)
EOSINOPHIL NFR BLD AUTO: 1.9 %
ERYTHROCYTE [DISTWIDTH] IN BLOOD BY AUTOMATED COUNT: 13.8 % (ref 10–15)
GFR SERPL CREATININE-BSD FRML MDRD: 83 ML/MIN/1.7M2
GLUCOSE SERPL-MCNC: 84 MG/DL (ref 70–99)
HCT VFR BLD AUTO: 41.1 % (ref 35–47)
HGB BLD-MCNC: 13.6 G/DL (ref 11.7–15.7)
IMM GRANULOCYTES # BLD: 0 10E9/L (ref 0–0.4)
IMM GRANULOCYTES NFR BLD: 0 %
LYMPHOCYTES # BLD AUTO: 2.2 10E9/L (ref 0.8–5.3)
LYMPHOCYTES NFR BLD AUTO: 45.9 %
MCH RBC QN AUTO: 32.2 PG (ref 26.5–33)
MCHC RBC AUTO-ENTMCNC: 33.1 G/DL (ref 31.5–36.5)
MCV RBC AUTO: 97 FL (ref 78–100)
MONOCYTES # BLD AUTO: 0.4 10E9/L (ref 0–1.3)
MONOCYTES NFR BLD AUTO: 9.3 %
NEUTROPHILS # BLD AUTO: 2 10E9/L (ref 1.6–8.3)
NEUTROPHILS NFR BLD AUTO: 42.7 %
NRBC # BLD AUTO: 0 10*3/UL
NRBC BLD AUTO-RTO: 0 /100
PLATELET # BLD AUTO: 208 10E9/L (ref 150–450)
POTASSIUM SERPL-SCNC: 4.4 MMOL/L (ref 3.4–5.3)
PROT SERPL-MCNC: 7.6 G/DL (ref 6.8–8.8)
RBC # BLD AUTO: 4.23 10E12/L (ref 3.8–5.2)
SODIUM SERPL-SCNC: 139 MMOL/L (ref 133–144)
WBC # BLD AUTO: 4.8 10E9/L (ref 4–11)

## 2018-12-18 PROCEDURE — 86160 COMPLEMENT ANTIGEN: CPT | Performed by: INTERNAL MEDICINE

## 2018-12-18 PROCEDURE — 80076 HEPATIC FUNCTION PANEL: CPT | Performed by: INTERNAL MEDICINE

## 2018-12-18 PROCEDURE — 80048 BASIC METABOLIC PNL TOTAL CA: CPT | Performed by: INTERNAL MEDICINE

## 2018-12-18 PROCEDURE — 36415 COLL VENOUS BLD VENIPUNCTURE: CPT | Performed by: INTERNAL MEDICINE

## 2018-12-18 PROCEDURE — 85025 COMPLETE CBC W/AUTO DIFF WBC: CPT | Performed by: INTERNAL MEDICINE

## 2018-12-19 NOTE — PROGRESS NOTES
"Lexii was seen today for establish care.  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  She has been following with an outside Rheumatologist and responded to enbrel and methotrexate.  She felt the need to establish with a primary care doctor for follow up.    She does get regular exercise but notices more shortness of breath with exertion.  We discussed starting with a routine echo and reassess symptoms.    We also discussed risks and benefits of starting screening mammograph and decided to proceed at age 39.    She otherwise feels well, no changes to past, family or social history.   ROS: 10 point ROS neg other than the symptoms noted above in the HPI.    PHYSICAL EXAM:  /67   Pulse 67   Resp 16   Ht 1.671 m (5' 5.79\")   Wt 54.7 kg (120 lb 9.6 oz)   BMI 19.59 kg/m    Constitutional: no distress, comfortable, pleasant   Eyes: anicteric, normal extra-ocular movements   Ears, Nose and Throat: tympanic membranes clear, nose clear and free of lesions, throat clear, neck supple with full range of motion, no thyromegaly.   Cardiovascular: regular rate and rhythm, normal S1 and S2, no murmurs, rubs or gallops, peripheral pulses full and symmetric   Respiratory: clear to auscultation, no wheezes or crackles, normal breath sounds   Gastrointestinal: positive bowel sounds, nontender, no hepatosplenomegaly, no masses   Musculoskeletal: knees full range of motion, no effusion   Skin: no concerning lesions, no jaundice   Neurological:  normal gait, no tremor   Psychological: appropriate mood   Lymphatic: no cervical lymphadenopathy and no pedal edema        A/P  38 year old female with a seronegative arthritis here to establish care, for PE, and mild dypsnea on exertion.      Routine history and physical examination of adult  -     " Mammogram, screening w tamie (3D); Future    Shortness of breath; could be related to anxiety or simple deconditioning but would like to rule out structural heart disease  -     Echocardiogram Complete; Future    RTC one year or sooner charlene Antoine MD

## 2018-12-20 ENCOUNTER — CARE COORDINATION (OUTPATIENT)
Dept: DERMATOLOGY | Facility: CLINIC | Age: 38
End: 2018-12-20

## 2018-12-20 DIAGNOSIS — M79.10 MYALGIA: ICD-10-CM

## 2018-12-20 LAB
CRP SERPL-MCNC: <2.9 MG/L (ref 0–8)
ERYTHROCYTE [SEDIMENTATION RATE] IN BLOOD BY WESTERGREN METHOD: 5 MM/H (ref 0–20)

## 2018-12-20 NOTE — PROGRESS NOTES
Discussed with Dr. Moralez (rheum) patient's symptoms of worsening nausea unrelated to timing of methotrexate dosing, diffuse myalgias, shoulder girdle pain and decreased c3/4. She did not feel that the nausea is related to the methotrexate taper. She recommended discussed with PCP as IBD may be associated with spondyloarthropathy, but again notes that serologies have so far been negative. She recommended recheck of KADIE as this had not been tested for over a year. Will check inflammatory markers and EBV titers given other symptoms and possible exposure to child with EBV symptoms. Discussed with Dr. Mendoza who will contact Dr. Antoine to arrange a f/up visit.

## 2018-12-21 LAB — ANA SER QL IF: NEGATIVE

## 2018-12-21 RX ORDER — LIDOCAINE HYDROCHLORIDE AND EPINEPHRINE 10; 10 MG/ML; UG/ML
3 INJECTION, SOLUTION INFILTRATION; PERINEURAL ONCE
Status: DISCONTINUED | OUTPATIENT
Start: 2018-12-21 | End: 2019-04-25

## 2018-12-27 LAB
EBV DNA SPEC QL NAA+PROBE: NOT DETECTED
SPECIMEN SOURCE: NORMAL

## 2019-01-17 ENCOUNTER — MYC MEDICAL ADVICE (OUTPATIENT)
Dept: OBGYN | Facility: CLINIC | Age: 39
End: 2019-01-17

## 2019-01-17 DIAGNOSIS — R10.2 PELVIC PAIN IN FEMALE: Primary | ICD-10-CM

## 2019-01-17 DIAGNOSIS — R30.0 DYSURIA: ICD-10-CM

## 2019-01-17 DIAGNOSIS — N83.8 OVARIAN MASS: ICD-10-CM

## 2019-01-18 ENCOUNTER — ANCILLARY PROCEDURE (OUTPATIENT)
Dept: CARDIOLOGY | Facility: CLINIC | Age: 39
End: 2019-01-18
Attending: INTERNAL MEDICINE
Payer: COMMERCIAL

## 2019-01-18 DIAGNOSIS — R06.02 SHORTNESS OF BREATH: ICD-10-CM

## 2019-01-23 ENCOUNTER — TELEPHONE (OUTPATIENT)
Dept: OBGYN | Facility: CLINIC | Age: 39
End: 2019-01-23

## 2019-01-23 ENCOUNTER — HOSPITAL ENCOUNTER (OUTPATIENT)
Dept: ULTRASOUND IMAGING | Facility: CLINIC | Age: 39
Discharge: HOME | End: 2019-01-23
Admitting: OBSTETRICS & GYNECOLOGY
Payer: COMMERCIAL

## 2019-01-23 DIAGNOSIS — R10.2 PELVIC PAIN IN FEMALE: ICD-10-CM

## 2019-01-23 LAB — RADIOLOGIST FLAGS: ABNORMAL

## 2019-01-23 PROCEDURE — 76830 TRANSVAGINAL US NON-OB: CPT

## 2019-01-23 NOTE — TELEPHONE ENCOUNTER
Radiology called to let us know of an urgent finding on patient's US that was done today.   There is a solid appearing left ovarian lesion. The radiologist wanted you to know this. The report is complete in patient's chart. Thanks.   Masha Quach RN-BSN

## 2019-01-24 ENCOUNTER — HOSPITAL ENCOUNTER (OUTPATIENT)
Dept: MRI IMAGING | Facility: CLINIC | Age: 39
Discharge: HOME | End: 2019-01-24
Payer: COMMERCIAL

## 2019-01-24 DIAGNOSIS — N83.8 OVARIAN MASS: ICD-10-CM

## 2019-01-24 PROCEDURE — A9585 GADOBUTROL INJECTION: HCPCS

## 2019-01-24 PROCEDURE — 25500064 ZZH RX 255 OP 636

## 2019-01-24 PROCEDURE — 72197 MRI PELVIS W/O & W/DYE: CPT

## 2019-01-24 PROCEDURE — 25000128 H RX IP 250 OP 636

## 2019-01-24 RX ORDER — GADOBUTROL 604.72 MG/ML
7.5 INJECTION INTRAVENOUS ONCE
Status: COMPLETED | OUTPATIENT
Start: 2019-01-24 | End: 2019-01-24

## 2019-01-24 RX ADMIN — SODIUM CHLORIDE 100 ML: 9 INJECTION, SOLUTION INTRAVENOUS at 18:14

## 2019-01-24 RX ADMIN — GADOBUTROL 7.5 ML: 604.72 INJECTION INTRAVENOUS at 18:16

## 2019-01-25 ENCOUNTER — PRE VISIT (OUTPATIENT)
Dept: MRI IMAGING | Facility: CLINIC | Age: 39
End: 2019-01-25

## 2019-01-25 ENCOUNTER — ONCOLOGY VISIT (OUTPATIENT)
Dept: ONCOLOGY | Facility: CLINIC | Age: 39
End: 2019-01-25
Payer: COMMERCIAL

## 2019-01-25 VITALS
TEMPERATURE: 98.2 F | WEIGHT: 123 LBS | BODY MASS INDEX: 19.77 KG/M2 | OXYGEN SATURATION: 100 % | RESPIRATION RATE: 18 BRPM | SYSTOLIC BLOOD PRESSURE: 120 MMHG | HEIGHT: 66 IN | DIASTOLIC BLOOD PRESSURE: 79 MMHG | HEART RATE: 94 BPM

## 2019-01-25 DIAGNOSIS — R19.00 PELVIC MASS: Primary | ICD-10-CM

## 2019-01-25 PROCEDURE — 99203 OFFICE O/P NEW LOW 30 MIN: CPT | Performed by: OBSTETRICS & GYNECOLOGY

## 2019-01-25 ASSESSMENT — MIFFLIN-ST. JEOR: SCORE: 1246.34

## 2019-01-25 ASSESSMENT — PAIN SCALES - GENERAL: PAINLEVEL: MILD PAIN (2)

## 2019-01-25 NOTE — NURSING NOTE
"Oncology Rooming Note    January 25, 2019 8:04 AM   Lexii Mendoza is a 38 year old female who presents for:    Chief Complaint   Patient presents with     Oncology Clinic Visit     New patient      Initial Vitals: /79   Pulse 94   Temp 98.2  F (36.8  C)   Resp 18   Ht 1.671 m (5' 5.79\")   Wt 55.8 kg (123 lb)   SpO2 100%   BMI 19.98 kg/m   Estimated body mass index is 19.98 kg/m  as calculated from the following:    Height as of this encounter: 1.671 m (5' 5.79\").    Weight as of this encounter: 55.8 kg (123 lb). Body surface area is 1.61 meters squared.  Mild Pain (2) Comment: Data Unavailable   No LMP recorded. Patient is not currently having periods (Reason: IUD).  Allergies reviewed: Yes  Medications reviewed: Yes    Medications: Medication refills not needed today.  Pharmacy name entered into [employer]:    [facility] [place], [place] - [address] [facility] [address]  [id number] - [place], [place] - [address] [facility] [address]  [id number] - [place], [place] - [address] AT [facility] & [facility]         5 minutes for nursing intake (face to face time)     Rose Diaz LPN              "

## 2019-01-25 NOTE — PROGRESS NOTES
Consult Notes on Referred Patient              RE: Lexii Mendoza  : 1980  JOSHUA: 2019      I had the pleasure of seeing your patient Lexii Mendoza here at the Gynecologic Cancer Clinic at the [facility] on 2019.  As you know she is a very pleasant 38 year old woman with a recent diagnosis of pelvic mass.  Given these findings she was subsequently sent to the Gynecologic Cancer Clinic for new patient consultation.  She is unaccompanied today.    Patient reports she had nipple tenderness approximately 2 weeks ago and then had some left lower quadrant pain.  A similar thing happened in 3/18 when she was also found to have a hemorrhagic cyst    3/19/18:  US Pelvis:  The uterus measures 9.5 x 3.7 x 5.2 cm, and there is no evidence of a focal fibroid.  The endometrium is within normal limits and measures 3 mm. Appropriately positioned IUD. There is no free fluid in the pelvis. The right ovary measures 3.0 x 3.5 x 3.0 cm and the left ovary measures 3.1 x 2.2 x 2.4 cm. There are 2 left ovarian hemorrhagic cyst, the largest measuring 1.0 x 1.1 x 1.2 cm. There is normal blood flow to the ovaries.    19:  US Pelvis:  The uterus measures 9.7 x 4.6 x 4.8 cm, with a 7 mm endometrial stripe. There is an IUD present within the endometrial canal, which appears appropriately positioned. There is no free fluid in the cul-de-sac. The right ovary measures 3.0 x 2.6 x 2.2 cm and contains normal color Doppler flow and a few small follicles. The left ovary measures 3.3 x 3.7 x 3.8 cm. Within the left ovary there is a solid-appearing 3.1 x 2.9 x 3.1 cm lesion which is predominantly isoechoic to the ovary. No flow is identified within this lesion. On the prior ultrasound there are 2 slightly complex hypoechoic cysts which measure 1 cm. Normal color Doppler flow is identified within the periphery of the left ovary.    19:  MRI pelvis:  1. 2.8 cm left ovarian structure with MRI findings concerning for  chronic hemorrhagic products corresponding to the abnormality seen on ultrasound 2019, favoring an ovarian endometrioma and less likely hemorrhagic cyst. Recommend follow-up with ultrasound in 6-12 weeks. 2. Appropriately positioned IUD.      Review of Systems:  Systemic           no weight changes; no fever; no chills; no night sweats; no appetite changes  Skin           no rashes, or lesions  Eye           no irritation; no changes in vision  Emerson-Laryngeal           no dysphagia; no hoarseness   Pulmonary    no cough; no shortness of breath  Cardiovascular    no chest pain; no palpitations  Gastrointestinal    no diarrhea; no constipation; no abdominal pain; no changes in bowel  habits; no blood in stool  Genitourinary   no urinary frequency; no urinary urgency; no dysuria; no pain; no abnormal vaginal discharge; no abnormal vaginal bleeding  Breast    no breast discharge; no breast changes; no breast pain  Musculoskeletal    no myalgias; no arthralgias; + back pain; + joint pain; + swelling; + stiffness  Psychiatric           no depressed mood; no anxiety    Hematologic            no tender lymph nodes; no noticeable swellings or lumps   Endocrine    no hot flashes; no heat/cold intolerance         Neurological   no tremor; no numbness and tingling; no headaches; no difficulty sleeping    Obstetrics and Gynecology History:  ,  x 2  IUD for contraception, has completed childbearing      Past Medical History:  Past Medical History:   Diagnosis Date     Arthritis      Breast disorder     extra breast tissue under right armpit     Fertility problem     in-vitro x2 with 2 resulting pregnancies     Varicosities        Past Surgical History:  Past Surgical History:   Procedure Laterality Date     NO HISTORY OF SURGERY         Health Maintenance:  Last Pap Smear: 3/9/18              Result: negative, HPV negative  She has not had a history of abnormal Pap smears.    Last Mammogram: N/A    Last  "Colonoscopy: N/A      Current Medications:   has a current medication list which includes the following prescription(s): adapalene, etanercept, folic acid, ibuprofen, methotrexate, multiple vitamins-minerals, levonorgestrel, meloxicam, omeprazole magnesium, and triamcinolone, and the following Facility-Administered Medications: lidocaine 1% with epinephrine 1:100,000.     Allergies:   Sulfasalazine      Social History:  Social History     Socioeconomic History     Marital status:      Spouse name: Not on file     Number of children: Not on file     Years of education: Not on file     Highest education level: Not on file   Social Needs     Financial resource strain: Not on file     Food insecurity - worry: Not on file     Food insecurity - inability: Not on file     Transportation needs - medical: Not on file     Transportation needs - non-medical: Not on file   Occupational History     Not on file   Tobacco Use     Smoking status: Never Smoker     Smokeless tobacco: Never Used   Substance and Sexual Activity     Alcohol use: Yes     Alcohol/week: 1.2 oz     Types: 2 Standard drinks or equivalent per week     Drug use: No     Sexual activity: Yes     Partners: Male     Birth control/protection: IUD   Other Topics Concern     Not on file   Social History Narrative     Not on file       Lives with her  and 3 small children, feels safe at home.  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      Family History:   The patient's family history is significant for.  Family History   Problem Relation Age of Onset     Family History Negative Other      Cancer No family hx of          Physical Exam:   /79   Pulse 94   Temp 98.2  F (36.8  C)   Resp 18   Ht 1.671 m (5' 5.79\")   Wt 55.8 kg (123 lb)   SpO2 100%   BMI 19.98 kg/m    Body mass index is 19.98 kg/m .    General Appearance: healthy and alert, no distress         Assessment:    Lexii Mendoza is a 38 year old woman with a new diagnosis " of pelvic mass.     A total of 30 minutes was spent with the patient, >50% of which were spent in counseling the patient and/or treatment planning.      Plan:     1.)    Pelvic mass.  We reviewed her recent US and MRI findings.  We discussed the possible etiologies including benign, malignant and borderline.  Given the imaging results, I believe this is almost certainly a benign cyst.  Likely she is having very infrequent ovulation cycles and this is also causing her breast tenderness.  We discussed the rationale for not performing a biopsy of the ovary.  We discussed options of immediate surgical resection vs repeat imaging and she would like to proceed with repeat imaging.  She will thus have a repeat US in 6-8 weeks and I will call her with the results.     2.) Genetic risk factors were assessed and the patient does not meet the qualifications for a referral.      3.) Labs and/or tests ordered include:  US Pelvis.     4.) Health maintenance issues addressed today include she is up to date.        Thank you for allowing us to participate in the care of your patient.         Sincerely,    Halie Mtz MD  Gynecologic Oncology  [facility]       CC

## 2019-01-25 NOTE — LETTER
2019         RE: Lexii Mendoza  [address] [place] [address]        Dear Colleague,    Thank you for referring your patient, Lexii Mendoza, to the [facility]. Please see a copy of my visit note below.    Consult Notes on Referred Patient              RE: Lexii Mendoza  : 1980  JOSHUA: 2019      I had the pleasure of seeing your patient Lexii Mendoza here at the Gynecologic Cancer Clinic at the [facility] on 2019.  As you know she is a very pleasant 38 year old woman with a recent diagnosis of pelvic mass.  Given these findings she was subsequently sent to the Gynecologic Cancer Clinic for new patient consultation.  She is unaccompanied today.    Patient reports she had nipple tenderness approximately 2 weeks ago and then had some left lower quadrant pain.  A similar thing happened in 3/18 when she was also found to have a hemorrhagic cyst    3/19/18:  US Pelvis:  The uterus measures 9.5 x 3.7 x 5.2 cm, and there is no evidence of a focal fibroid.  The endometrium is within normal limits and measures 3 mm. Appropriately positioned IUD. There is no free fluid in the pelvis. The right ovary measures 3.0 x 3.5 x 3.0 cm and the left ovary measures 3.1 x 2.2 x 2.4 cm. There are 2 left ovarian hemorrhagic cyst, the largest measuring 1.0 x 1.1 x 1.2 cm. There is normal blood flow to the ovaries.    19:  US Pelvis:  The uterus measures 9.7 x 4.6 x 4.8 cm, with a 7 mm endometrial stripe. There is an IUD present within the endometrial canal, which appears appropriately positioned. There is no free fluid in the cul-de-sac. The right ovary measures 3.0 x 2.6 x 2.2 cm and contains normal color Doppler flow and a few small follicles. The left ovary measures 3.3 x 3.7 x 3.8 cm. Within the left ovary there is a solid-appearing 3.1 x 2.9 x 3.1 cm lesion which is predominantly isoechoic to the ovary. No flow is identified within this lesion. On the prior  ultrasound there are 2 slightly complex hypoechoic cysts which measure 1 cm. Normal color Doppler flow is identified within the periphery of the left ovary.    19:  MRI pelvis:  1. 2.8 cm left ovarian structure with MRI findings concerning for chronic hemorrhagic products corresponding to the abnormality seen on ultrasound 2019, favoring an ovarian endometrioma and less likely hemorrhagic cyst. Recommend follow-up with ultrasound in 6-12 weeks. 2. Appropriately positioned IUD.      Review of Systems:  Systemic           no weight changes; no fever; no chills; no night sweats; no appetite changes  Skin           no rashes, or lesions  Eye           no irritation; no changes in vision  Emerson-Laryngeal           no dysphagia; no hoarseness   Pulmonary    no cough; no shortness of breath  Cardiovascular    no chest pain; no palpitations  Gastrointestinal    no diarrhea; no constipation; no abdominal pain; no changes in bowel  habits; no blood in stool  Genitourinary   no urinary frequency; no urinary urgency; no dysuria; no pain; no abnormal vaginal discharge; no abnormal vaginal bleeding  Breast    no breast discharge; no breast changes; no breast pain  Musculoskeletal    no myalgias; no arthralgias; + back pain; + joint pain; + swelling; + stiffness  Psychiatric           no depressed mood; no anxiety    Hematologic            no tender lymph nodes; no noticeable swellings or lumps   Endocrine    no hot flashes; no heat/cold intolerance         Neurological   no tremor; no numbness and tingling; no headaches; no difficulty sleeping    Obstetrics and Gynecology History:  ,  x 2  IUD for contraception, has completed childbearing      Past Medical History:  Past Medical History:   Diagnosis Date     Arthritis      Breast disorder     extra breast tissue under right armpit     Fertility problem     in-vitro x2 with 2 resulting pregnancies     Varicosities        Past Surgical History:  Past Surgical  History:   Procedure Laterality Date     NO HISTORY OF SURGERY         Health Maintenance:  Last Pap Smear: 3/9/18              Result: negative, HPV negative  She has not had a history of abnormal Pap smears.    Last Mammogram: N/A    Last Colonoscopy: N/A      Current Medications:   has a current medication list which includes the following prescription(s): adapalene, etanercept, folic acid, ibuprofen, methotrexate, multiple vitamins-minerals, levonorgestrel, meloxicam, omeprazole magnesium, and triamcinolone, and the following Facility-Administered Medications: lidocaine 1% with epinephrine 1:100,000.     Allergies:   Sulfasalazine      Social History:  Social History     Socioeconomic History     Marital status:      Spouse name: Not on file     Number of children: Not on file     Years of education: Not on file     Highest education level: Not on file   Social Needs     Financial resource strain: Not on file     Food insecurity - worry: Not on file     Food insecurity - inability: Not on file     Transportation needs - medical: Not on file     Transportation needs - non-medical: Not on file   Occupational History     Not on file   Tobacco Use     Smoking status: Never Smoker     Smokeless tobacco: Never Used   Substance and Sexual Activity     Alcohol use: Yes     Alcohol/week: 1.2 oz     Types: 2 Standard drinks or equivalent per week     Drug use: No     Sexual activity: Yes     Partners: Male     Birth control/protection: IUD   Other Topics Concern     Not on file   Social History Narrative     Not on file       Lives with her  and 3 small children, feels safe at home.  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      Family History:   The patient's family history is significant for.  Family History   Problem Relation Age of Onset     Family History Negative Other      Cancer No family hx of          Physical Exam:   /79   Pulse 94   Temp 98.2  F (36.8  C)   Resp 18   Ht  "1.671 m (5' 5.79\")   Wt 55.8 kg (123 lb)   SpO2 100%   BMI 19.98 kg/m     Body mass index is 19.98 kg/m .    General Appearance: healthy and alert, no distress         Assessment:    Lexii Mendoza is a 38 year old woman with a new diagnosis of pelvic mass.     A total of 30 minutes was spent with the patient, >50% of which were spent in counseling the patient and/or treatment planning.      Plan:     1.)    Pelvic mass.  We reviewed her recent US and MRI findings.  We discussed the possible etiologies including benign, malignant and borderline.  Given the imaging results, I believe this is almost certainly a benign cyst.  Likely she is having very infrequent ovulation cycles and this is also causing her breast tenderness.  We discussed the rationale for not performing a biopsy of the ovary.  We discussed options of immediate surgical resection vs repeat imaging and she would like to proceed with repeat imaging.  She will thus have a repeat US in 6-8 weeks and I will call her with the results.     2.) Genetic risk factors were assessed and the patient does not meet the qualifications for a referral.      3.) Labs and/or tests ordered include:  US Pelvis.     4.) Health maintenance issues addressed today include she is up to date.        Thank you for allowing us to participate in the care of your patient.         Sincerely,    Halie Mtz MD  Gynecologic Oncology  [facility]           "

## 2019-01-28 ENCOUNTER — TRANSFERRED RECORDS (OUTPATIENT)
Dept: HEALTH INFORMATION MANAGEMENT | Facility: CLINIC | Age: 39
End: 2019-01-28

## 2019-01-29 DIAGNOSIS — N83.202 CYST OF LEFT OVARY: Primary | ICD-10-CM

## 2019-03-11 ENCOUNTER — ANCILLARY PROCEDURE (OUTPATIENT)
Dept: ULTRASOUND IMAGING | Facility: CLINIC | Age: 39
End: 2019-03-11
Payer: COMMERCIAL

## 2019-03-11 DIAGNOSIS — N83.202 CYST OF LEFT OVARY: ICD-10-CM

## 2019-03-11 PROCEDURE — 76830 TRANSVAGINAL US NON-OB: CPT | Performed by: OBSTETRICS & GYNECOLOGY

## 2019-04-05 ENCOUNTER — ANCILLARY PROCEDURE (OUTPATIENT)
Dept: MAMMOGRAPHY | Facility: CLINIC | Age: 39
End: 2019-04-05
Attending: INTERNAL MEDICINE
Payer: COMMERCIAL

## 2019-04-05 DIAGNOSIS — Z00.00 ROUTINE HISTORY AND PHYSICAL EXAMINATION OF ADULT: ICD-10-CM

## 2019-04-25 ENCOUNTER — OFFICE VISIT (OUTPATIENT)
Dept: OBGYN | Facility: CLINIC | Age: 39
End: 2019-04-25
Payer: COMMERCIAL

## 2019-04-25 VITALS
TEMPERATURE: 97.9 F | SYSTOLIC BLOOD PRESSURE: 103 MMHG | BODY MASS INDEX: 20.26 KG/M2 | HEART RATE: 77 BPM | WEIGHT: 124.7 LBS | DIASTOLIC BLOOD PRESSURE: 71 MMHG

## 2019-04-25 DIAGNOSIS — N83.202 CYST OF LEFT OVARY: Primary | ICD-10-CM

## 2019-04-25 PROCEDURE — 99213 OFFICE O/P EST LOW 20 MIN: CPT | Performed by: OBSTETRICS & GYNECOLOGY

## 2019-04-25 NOTE — NURSING NOTE
"Chief Complaint   Patient presents with     Follow Up     f/u pelvic pain       Initial /71   Pulse 77   Temp 97.9  F (36.6  C) (Oral)   Wt 56.6 kg (124 lb 11.2 oz)   BMI 20.26 kg/m   Estimated body mass index is 20.26 kg/m  as calculated from the following:    Height as of 19: 1.671 m (5' 5.79\").    Weight as of this encounter: 56.6 kg (124 lb 11.2 oz).  BP completed using cuff size: regular    Questioned patient about current smoking habits.  Pt. has never smoked.          The following HM Due: NONE      The following patient reported/Care Every where data was sent to:  P ABSTRACT QUALITY INITIATIVES [[id number]]        patient has appointment for today  Diann Vital                "

## 2019-04-25 NOTE — PROGRESS NOTES
S; Lexii Mendoza is a 39 year old  who is here to discuss recent ultrasound results. She had an initial us in 19 for pelvic pain and breast tenderness.  She was found to have a 3.1cm solid appearing lesion of the left ovary and this was followed with an MRI that diagnosed a hemorrhagic cyst, possible endometrioma.  She then had a f/u ultrasound on 3/11/19 that showed a smaller 2.1cm left cyst that and a possible corpus luteum on the right.    Since then she has not had any pain.  Looking back, she wonders if the pain and breast tenderness may be related to ovulation.  She is otherwise without complaints.    O: /71   Pulse 77   Temp 97.9  F (36.6  C) (Oral)   Wt 56.6 kg (124 lb 11.2 oz)   BMI 20.26 kg/m      Gen: healthy appearing female in NAD    A/P; Ovarian cyst   We reviewed both her us images together and I explained that I am not concerned with the cysts seen.  On the most recent us, the left ovary (complex cyst) almost looks more like normal variation of the ovarian stroma, so it is possible there was a hemorrhagic cyst that has resolved.  We discussed options of f/u ultrasound vs just monitoring for symptoms and she prefers to monitor.  Questions answered.  RTC prn, when due for annual/pap.    AKRIN BHANDARI MD      This visit lasted approximately 20 minutes and >50% of the time was spent on counseling about ovarian cyst.

## 2019-04-30 ENCOUNTER — OFFICE VISIT (OUTPATIENT)
Dept: INTERNAL MEDICINE | Facility: CLINIC | Age: 39
End: 2019-04-30
Payer: COMMERCIAL

## 2019-04-30 VITALS
DIASTOLIC BLOOD PRESSURE: 76 MMHG | BODY MASS INDEX: 20.27 KG/M2 | WEIGHT: 124.78 LBS | RESPIRATION RATE: 16 BRPM | SYSTOLIC BLOOD PRESSURE: 115 MMHG | HEART RATE: 74 BPM

## 2019-04-30 DIAGNOSIS — K21.9 GASTROESOPHAGEAL REFLUX DISEASE WITHOUT ESOPHAGITIS: Primary | ICD-10-CM

## 2019-04-30 ASSESSMENT — PAIN SCALES - GENERAL: PAINLEVEL: NO PAIN (0)

## 2019-04-30 NOTE — PATIENT INSTRUCTIONS
"My book which you might enjoy reading is called \"Mirth is God's Medicine\" and it's available on Amazon.  Soon, book 2 will be as well--\"With Miryoselyn and Laughter\"  It was great to see you today!  Come back in October or sooner as needed.  Victorina Garcia MD  "

## 2019-05-01 NOTE — PROGRESS NOTES
Lexii is here to follow up a history of seronegative RA Arthritis, Breast disorder, Fertility problem, and Varicosities.   Total time spent 25 minutes.  More than 50% of the time spent with Ms. Mendoza on counseling / coordinating her care.    Since our last visit, she went through a bit of a scare with some lower abdominal pain and imaging that showed a solid mass on the ovary, which turns out to be consistent with endometrioma based on repeat pelvic ultrasound imaging.  Seen by both Gyn and Gyn Onc during this time.    At our last appointment, she also had some mild shortness of breath with exertion and I ordered an echo which was normal.    Today, she also reports increased abdominal symptoms attributed to GERD.  Including upper abdominal discomfort after eating classic trigger foods, including alcohol, spicy food, mint, acidic foods and so on.  It responds to both zantac prn and also a PPI.  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  No red flag symptoms, no weight loss, dark stool etc.  We discussed a strategy of taking a PPI daily for 4-6 weeks during periods of high stress and then going back to zantac prn.    Finally, she did note occasional easy bruising, particularly over joints and then, had a subconjunctival hemorrhage about a month ago.  Showed me pictures on her phone, a classic appearance.  She denied any coughing, heaving lifting or straining at the time; resolved without complication.  She wonders if the two are related.  I reviewed normal platelet count and INR in her lab work.    On exam  /76 (BP Location: Right arm, Patient Position: Sitting, Cuff Size: Adult Regular)   Pulse 74   Resp 16   Wt 56.6 kg (124 lb 12.5 oz)   Breastfeeding? No   BMI 20.27 kg/m     Abd +BS, S, NT, ND, no mass or hepatosplenomegaly    A/P    1.  GERD:  Reassurance was given, continue management as above.    2.  Ovarian cyst/endometrioma:   Defer further workup to Gyn. I did mention that I have seen a very similar presentation to hers, before, and that endometriosis is still possible in her age range; she will monitor for any cyclical pain.    3.  RA:   No symptoms at this time.    4.  Easy bruising:  Monitor, consider checking antiplatelet antibodies or a platelet functional assay if more problems arise.     Continue current Outpatient Medications:      adapalene (DIFFERIN) 0.1 % cream, To face at bedtime, Disp: 45 g, Rfl: 11     Etanercept (ENBREL SC), Inject 50 mg Subcutaneous once a week, Disp: , Rfl:      FOLIC ACID PO, Take 1 mg by mouth daily, Disp: , Rfl:      IBUPROFEN PO, Take 400 mg by mouth, Disp: , Rfl:      METHOTREXATE PO, Take 20 mg by mouth once a week , Disp: , Rfl:      Multiple Vitamins-Minerals (MULTIVITAMIN ADULTS PO), , Disp: , Rfl:      Omeprazole Magnesium (PRILOSEC OTC PO), , Disp: , Rfl:      levonorgestrel (MIRENA) 20 MCG/24HR IUD, 1 each (20 mcg) by Intrauterine route once for 1 dose, Disp: 1 each, Rfl: 0    RTC 6 months or sooner prn    --Victorina Antoine MD

## 2019-05-17 ENCOUNTER — TRANSFERRED RECORDS (OUTPATIENT)
Dept: HEALTH INFORMATION MANAGEMENT | Facility: CLINIC | Age: 39
End: 2019-05-17

## 2019-05-22 ENCOUNTER — TELEPHONE (OUTPATIENT)
Dept: DERMATOLOGY | Facility: CLINIC | Age: 39
End: 2019-05-22

## 2019-05-22 NOTE — TELEPHONE ENCOUNTER
David Rodriguez MD [place], Kayrene, [facility]; Desiree Barrios MA Hi Kayrene,     Can you add this patient on to my schedule on Friday, 5/31? Please create a new extended slot from 12:10-12:40pm to accommodate her.     Thank you!   Shlomo

## 2019-05-31 ENCOUNTER — PRE VISIT (OUTPATIENT)
Dept: DERMATOLOGY | Facility: CLINIC | Age: 39
End: 2019-05-31

## 2019-05-31 ENCOUNTER — OFFICE VISIT (OUTPATIENT)
Dept: DERMATOLOGY | Facility: CLINIC | Age: 39
End: 2019-05-31
Payer: COMMERCIAL

## 2019-05-31 VITALS — HEART RATE: 65 BPM | DIASTOLIC BLOOD PRESSURE: 78 MMHG | OXYGEN SATURATION: 97 % | SYSTOLIC BLOOD PRESSURE: 123 MMHG

## 2019-05-31 DIAGNOSIS — M35.9 UNDIFFERENTIATED CONNECTIVE TISSUE DISEASE (H): Primary | ICD-10-CM

## 2019-05-31 ASSESSMENT — PAIN SCALES - GENERAL: PAINLEVEL: NO PAIN (0)

## 2019-05-31 NOTE — NURSING NOTE
Dermatology Rooming Note    Lexii Mendoza's goals for this visit include:   Chief Complaint   Patient presents with     Derm Problem     Lexii, is being seen today for a consult, rashs ( elbows & knees), joint pain ( not now due to meds), as reported by patient.     Arely Villanueva LPN

## 2019-05-31 NOTE — PROGRESS NOTES
[facility] Dermatology Note      Dermatology Problem List:  1. 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; repeat biopsy 12/2018 consistent with reactive granulomatous dermatitis)   - some increased fatigue and decreased exercise tolerance in context of increased stress; some shortness of breath   - current treatment: etanercept 50 mg weekly, methotrexate taper (current dose 10 mg weekly; hair loss at higher doses)   - prior treatment: meloxicam, hydroxychloroquine (strange dreams), sulfasalazine (elevated LFTs)   - Labs: slightly decreased C3/C4, minimally elevated CK on one occasion    - normal/negative: KADIE, RF, CCP, SPEP, CRP/ESR, cryoglobulins, HLA B27, Lyme    - reportedly normal/negative: myositis panel, systemic sclerosis panel, ENAs   - Imaging: normal mammogram, endometrioma on pelvic imaging, CT PE protocol negative, TTE essentially normal    2. Purpuric patches on fingers induced by minimal trauma; recent subconjunctival hemorrhage and subungual hematoma induced by minimal trauma - ?capillary fragility, though intermittent. Discussed checking vitamin C but low suspicion for vitamin C deficiency    3. Acne vulgaris - adapalene    4. DSAP, arms      Encounter Date: May 31, 2019    CC:  Chief Complaint   Patient presents with     Derm Giovana Shultz, is being seen today for a consult, rashs ( elbows & knees), joint pain ( not now due to meds), as reported by patient.         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 The patient reports that 2 years ago, she developed bumps on her elbows at the beginning of the summer. Shortly thereafter, she developed shoulder pain and swelling of two fingers which resolved on their own. A couple months later, she began waking up with swelling in her hands without any associated pain or stiffness. She presented to a rheumatologist for evaluation and was placed on hydroxychloroquine but began having abnormal dreams. She also took sulfasalazine and meloxicam, but she did not tolerate these medications. In the fall of 2017, her arthritis and swelling increased, and she was placed on methotrexate and prednisone. She was then placed etanercept in early 2018 as she began developing a fair amount of new symptoms including foot swelling. At the end of this past winter, she began tapering methotrexate began developing thinning of her hair, and she was concerned for staying on it long-term. She is now on 10 mg weekly and has been noticing marked hair regrowth. The bumps on her elbows were biopsied, and she reports that she also developed abnormal nailbed capillaries. She would like to elucidate the underlying etiology. The hand swelling, arthritis, and bumps have been gradually resolving, though she also has been developing unexplained bruising after minimal trauma. She reports \"I would  a pot, feel a pop in my finger, and then develop a bruise in the area immediately.\" She denies any pain associated with the bruising. She has developed a subconjunctival hemorrhage at one point without any known inciting injury, and she also subungual hematoma after tying her shoes too tight when running. She has also been developing a sun-burned, pink appearance on her upper chest and face without being exposed to the sun. She denies any severe dyspnea, but she reports that she developed a sudden onset of dyspnea while on " vacation at one point which prompted her to present to an ED for evaluation. She notes that a CT at that time was unremarkable. 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 She also reports that she has been developing increased redness on her thighs, particularly after getting out of the shower. She has a history of Raynaud's since childood.       Past Medical History:   Patient Active Problem List   Diagnosis     NO ACTIVE PROBLEMS     Encounter for IUD removal and reinsertion     Past Medical History:   Diagnosis Date     Arthritis      Breast disorder     extra breast tissue under right armpit     Fertility problem     in-vitro x2 with 2 resulting pregnancies     Varicosities      Past Surgical History:   Procedure Laterality Date     NO HISTORY OF SURGERY         Social History:  Patient reports that she has never smoked. She has never used smokeless tobacco. She reports that she drinks about 1.2 oz of alcohol per week. She reports that she does not use drugs.    Family History:  Family History   Problem Relation Age of Onset     Family History Negative Other      Cancer No family hx of        Medications:  Current Outpatient Medications   Medication Sig Dispense Refill     adapalene (DIFFERIN) 0.1 % cream To face at bedtime 45 g 11     Etanercept (ENBREL SC) Inject 50 mg Subcutaneous once a week       FOLIC ACID PO Take 1 mg by mouth daily       IBUPROFEN PO Take 400 mg by mouth       METHOTREXATE PO Take 20 mg by mouth once a week        Multiple Vitamins-Minerals (MULTIVITAMIN ADULTS PO)        levonorgestrel (MIRENA) 20 MCG/24HR IUD 1 each (20 mcg) by Intrauterine route once for 1 dose 1 each 0     Omeprazole Magnesium (PRILOSEC OTC PO)          Allergies   Allergen Reactions     Sulfasalazine Rash     Rash and elevated LFT       Review of Systems:  -Constitutional: Otherwise feeling well today, in usual state of  "health.  -HEENT: Patient denies nonhealing oral sores.  -Skin: As above in HPI. No additional skin concerns.    Physical exam:  Vitals: /78 (BP Location: Right arm, Patient Position: Chair, Cuff Size: Adult Regular)   Pulse 65   SpO2 97%   GEN: This is a well developed, well-nourished female in no acute distress, in a pleasant mood.    SKIN: Palmer phototype: II   Sun-exposed skin, which includes the head/face, neck, both arms, digits, and/or nails was examined.   -few faint pink firm papules on the extensor elbows   -Livedo reticularis on the forearms   Pt supplied photos demonstrate:  -raynaud's on russell fingers   -Purpuric/violaceous patches on the hands   -subconjunctival hemorrhage  -Ill defined erythema on the cheeks, upper chest, and lateral thighs; the latter in a vague livedo pattern  -No other lesions of concern on areas examined.       Impression/Plan:    Inflammatory arthropathy: with some features of an undifferentiated connective tissue disease including Raynaud's, livedo reticularis, and flushing reponse on the face chest and thighs. To date, broad workup has been largely unrevealing including a notably negative KADIE as well as ENAs, inflammatory markers, myositis panel, and a systemic sclerosis panel. Pt has had slightly depressed C3 and C4, but her rheumatoid factor and CCP have both been negative. She has had 2 biopsies on the extensor elbows which demonstrated granulomatous dermatitis reactions. I suspect this is likley related to underlying arthropathy/connective tissue disease. She demonstrates slightly atypical nailfold capillaries with some dilation but no dropout more consistent with the so-called \"lupus pattern.\" Given her somewhat decreased exercise tolerance in the context of the above differential, will check PFTs. We discussed symptoms of apparent capillary fragility but have a low suspicion of vitamin C deficiency or Alysia-Danlos given her diet and timeline of onset of " symptoms. Also discussed checking anticardiolipin antibodies in light of the livedo and possible UCTD, but will defer at this time. At this time, I do not have a compelling, more specific/unifying diagnosis that would account for the above symptoms    Discussed potential etiologies as well as the risks and benefits of further diagnostic work-up. Also discussed the risks and benefits of tapering down methotrexate, continuing etanercept, starting quinacrine (difficult to find at present) or dapsone (would need to check G6PD)    Continue: methotrexate taper, etanercept 50 mg subcutaneously weekly as previously prescribed    Labs today: PFTs with DLCO    Follow-up prn for new or changing lesions.       Staff Involved:  Scribe/Staff    Scribe Disclosure  I, Dominic Najjar, am serving as a scribe to document services personally performed by Dr. David Rodriguez MD, based on data collection and the provider's statements to me.    Provider Disclosure:   The documentation recorded by the scribe accurately reflects the services I personally performed and the decisions made by me.    David Rodriguez MD   of Dermatology  Department of Dermatology  [facility]

## 2019-05-31 NOTE — LETTER
5/31/2019       RE: Lexii Mendoza  [address]  [place] [address]     Dear Colleague,    Thank you for referring your patient, Lexii Mendoza, to the [facility] DERMATOLOGY at [facility]. Please see a copy of my visit note below.    [facility] Dermatology Note      Dermatology Problem List:  1. 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; repeat biopsy 12/2018 consistent with reactive granulomatous dermatitis)   - some increased fatigue and decreased exercise tolerance in context of increased stress; some shortness of breath   - current treatment: etanercept 50 mg weekly, methotrexate taper (current dose 10 mg weekly; hair loss at higher doses)   - prior treatment: meloxicam, hydroxychloroquine (strange dreams), sulfasalazine (elevated LFTs)   - Labs: slightly decreased C3/C4, minimally elevated CK on one occasion    - normal/negative: KADIE, RF, CCP, SPEP, CRP/ESR, cryoglobulins, HLA B27, Lyme    - reportedly normal/negative: myositis panel, systemic sclerosis panel, ENAs   - Imaging: normal mammogram, endometrioma on pelvic imaging, CT PE protocol negative, TTE essentially normal    2. Purpuric patches on fingers induced by minimal trauma; recent subconjunctival hemorrhage and subungual hematoma induced by minimal trauma - ?capillary fragility, though intermittent. Discussed checking vitamin C but low suspicion for vitamin C deficiency    3. Acne vulgaris - adapalene    4. DSAP, arms      Encounter Date: May 31, 2019    CC:  Chief  "Complaint   Patient presents with     Derm Problem     Lexii, is being seen today for a consult, rashs ( elbows & knees), joint pain ( not now due to meds), as reported by patient.         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 The patient reports that 2 years ago, she developed bumps on her elbows at the beginning of the summer. Shortly thereafter, she developed shoulder pain and swelling of two fingers which resolved on their own. A couple months later, she began waking up with swelling in her hands without any associated pain or stiffness. She presented to a rheumatologist for evaluation and was placed on hydroxychloroquine but began having abnormal dreams. She also took sulfasalazine and meloxicam, but she did not tolerate these medications. In the fall of 2017, her arthritis and swelling increased, and she was placed on methotrexate and prednisone. She was then placed etanercept in early 2018 as she began developing a fair amount of new symptoms including foot swelling. At the end of this past winter, she began tapering methotrexate began developing thinning of her hair, and she was concerned for staying on it long-term. She is now on 10 mg weekly and has been noticing marked hair regrowth. The bumps on her elbows were biopsied, and she reports that she also developed abnormal nailbed capillaries. She would like to elucidate the underlying etiology. The hand swelling, arthritis, and bumps have been gradually resolving, though she also has been developing unexplained bruising after minimal trauma. She reports \"I would  a pot, feel a pop in my finger, and then develop a bruise in the area immediately.\" She denies any pain associated with the bruising. She has developed a subconjunctival hemorrhage at one point without any known inciting injury, and she also " subungual hematoma after tying her shoes too tight when running. She has also been developing a sun-burned, pink appearance on her upper chest and face without being exposed to the sun. She denies any severe dyspnea, but she reports that she developed a sudden onset of dyspnea while on vacation at one point which prompted her to present to an ED for evaluation. She notes that a CT at that time was unremarkable. 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 She also reports that she has been developing increased redness on her thighs, particularly after getting out of the shower. She has a history of Raynaud's since childood.       Past Medical History:   Patient Active Problem List   Diagnosis     NO ACTIVE PROBLEMS     Encounter for IUD removal and reinsertion     Past Medical History:   Diagnosis Date     Arthritis      Breast disorder     extra breast tissue under right armpit     Fertility problem     in-vitro x2 with 2 resulting pregnancies     Varicosities      Past Surgical History:   Procedure Laterality Date     NO HISTORY OF SURGERY         Social History:  Patient reports that she has never smoked. She has never used smokeless tobacco. She reports that she drinks about 1.2 oz of alcohol per week. She reports that she does not use drugs.    Family History:  Family History   Problem Relation Age of Onset     Family History Negative Other      Cancer No family hx of        Medications:  Current Outpatient Medications   Medication Sig Dispense Refill     adapalene (DIFFERIN) 0.1 % cream To face at bedtime 45 g 11     Etanercept (ENBREL SC) Inject 50 mg Subcutaneous once a week       FOLIC ACID PO Take 1 mg by mouth daily       IBUPROFEN PO Take 400 mg by mouth       METHOTREXATE PO Take 20 mg by mouth once a week        Multiple Vitamins-Minerals (MULTIVITAMIN ADULTS PO)        levonorgestrel (MIRENA) 20 MCG/24HR IUD 1 each  "(20 mcg) by Intrauterine route once for 1 dose 1 each 0     Omeprazole Magnesium (PRILOSEC OTC PO)          Allergies   Allergen Reactions     Sulfasalazine Rash     Rash and elevated LFT       Review of Systems:  -Constitutional: Otherwise feeling well today, in usual state of health.  -HEENT: Patient denies nonhealing oral sores.  -Skin: As above in HPI. No additional skin concerns.    Physical exam:  Vitals: /78 (BP Location: Right arm, Patient Position: Chair, Cuff Size: Adult Regular)   Pulse 65   SpO2 97%   GEN: This is a well developed, well-nourished female in no acute distress, in a pleasant mood.    SKIN: Palmer phototype: II   Sun-exposed skin, which includes the head/face, neck, both arms, digits, and/or nails was examined.   -few faint pink firm papules on the extensor elbows   -Livedo reticularis on the forearms   Pt supplied photos demonstrate:  -raynaud's on russell fingers   -Purpuric/violaceous patches on the hands   -subconjunctival hemorrhage  -Ill defined erythema on the cheeks, upper chest, and lateral thighs; the latter in a vague livedo pattern  -No other lesions of concern on areas examined.     Impression/Plan:    Inflammatory arthropathy: with some features of an undifferentiated connective tissue disease including Raynaud's, livedo reticularis, and flushing reponse on the face chest and thighs. To date, broad workup has been largely unrevealing including a notably negative KADIE as well as ENAs, inflammatory markers, myositis panel, and a systemic sclerosis panel. Pt has had slightly depressed C3 and C4, but her rheumatoid factor and CCP have both been negative. She has had 2 biopsies on the extensor elbows which demonstrated granulomatous dermatitis reactions. I suspect this is likley related to underlying arthropathy/connective tissue disease. She demonstrates slightly atypical nailfold capillaries with some dilation but no dropout more consistent with the so-called \"lupus " "pattern.\" Given her somewhat decreased exercise tolerance in the context of the above differential, will check PFTs. We discussed symptoms of apparent capillary fragility but have a low suspicion of vitamin C deficiency or Alysia-Danlos given her diet and timeline of onset of symptoms. Also discussed checking anticardiolipin antibodies in light of the livedo and possible UCTD, but will defer at this time. At this time, I do not have a compelling, more specific/unifying diagnosis that would account for the above symptoms    Discussed potential etiologies as well as the risks and benefits of further diagnostic work-up. Also discussed the risks and benefits of tapering down methotrexate, continuing etanercept, starting quinacrine (difficult to find at present) or dapsone (would need to check G6PD)    Continue: methotrexate taper, etanercept 50 mg subcutaneously weekly as previously prescribed    Labs today: PFTs with DLCO    Follow-up prn for new or changing lesions.     Staff Involved:  Scribe/Staff    Scribe Disclosure  I, Dominic Najjar, am serving as a scribe to document services personally performed by Dr. David Rodriguez MD, based on data collection and the provider's statements to me.    Provider Disclosure:   The documentation recorded by the scribe accurately reflects the services I personally performed and the decisions made by me.    David Rodriguez MD   of Dermatology  Department of Dermatology  [facility]  "

## 2019-06-04 ENCOUNTER — MYC MEDICAL ADVICE (OUTPATIENT)
Dept: INTERNAL MEDICINE | Facility: CLINIC | Age: 39
End: 2019-06-04

## 2019-06-04 NOTE — TELEPHONE ENCOUNTER
Responded to pt to call and make an apt via [facility]. Also advised ER if other symptoms happened before she could get in. Susy Hernandez on 6/4/2019 at 4:01 PM

## 2019-06-07 ENCOUNTER — OFFICE VISIT (OUTPATIENT)
Dept: INTERNAL MEDICINE | Facility: CLINIC | Age: 39
End: 2019-06-07
Payer: COMMERCIAL

## 2019-06-07 VITALS
DIASTOLIC BLOOD PRESSURE: 77 MMHG | HEART RATE: 73 BPM | BODY MASS INDEX: 19.95 KG/M2 | WEIGHT: 122.8 LBS | RESPIRATION RATE: 16 BRPM | SYSTOLIC BLOOD PRESSURE: 116 MMHG | TEMPERATURE: 98 F

## 2019-06-07 DIAGNOSIS — N83.201 CYSTS OF BOTH OVARIES: ICD-10-CM

## 2019-06-07 DIAGNOSIS — R10.84 ABDOMINAL PAIN, GENERALIZED: Primary | ICD-10-CM

## 2019-06-07 DIAGNOSIS — N83.202 CYSTS OF BOTH OVARIES: ICD-10-CM

## 2019-06-07 DIAGNOSIS — R10.84 ABDOMINAL PAIN, GENERALIZED: ICD-10-CM

## 2019-06-07 LAB
ALBUMIN SERPL-MCNC: 4 G/DL (ref 3.4–5)
ALBUMIN UR-MCNC: NEGATIVE MG/DL
ALP SERPL-CCNC: 63 U/L (ref 40–150)
ALT SERPL W P-5'-P-CCNC: 25 U/L (ref 0–50)
ANION GAP SERPL CALCULATED.3IONS-SCNC: 3 MMOL/L (ref 3–14)
APPEARANCE UR: CLEAR
AST SERPL W P-5'-P-CCNC: 23 U/L (ref 0–45)
BASOPHILS # BLD AUTO: 0 10E9/L (ref 0–0.2)
BASOPHILS NFR BLD AUTO: 1.1 %
BILIRUB SERPL-MCNC: 0.8 MG/DL (ref 0.2–1.3)
BILIRUB UR QL STRIP: NEGATIVE
BUN SERPL-MCNC: 18 MG/DL (ref 7–30)
CALCIUM SERPL-MCNC: 8.8 MG/DL (ref 8.5–10.1)
CHLORIDE SERPL-SCNC: 111 MMOL/L (ref 94–109)
CO2 SERPL-SCNC: 25 MMOL/L (ref 20–32)
COLOR UR AUTO: ABNORMAL
CREAT SERPL-MCNC: 0.72 MG/DL (ref 0.52–1.04)
CRP SERPL-MCNC: <2.9 MG/L (ref 0–8)
DIFFERENTIAL METHOD BLD: ABNORMAL
EOSINOPHIL # BLD AUTO: 0.1 10E9/L (ref 0–0.7)
EOSINOPHIL NFR BLD AUTO: 2.9 %
ERYTHROCYTE [DISTWIDTH] IN BLOOD BY AUTOMATED COUNT: 13.2 % (ref 10–15)
ERYTHROCYTE [SEDIMENTATION RATE] IN BLOOD BY WESTERGREN METHOD: 4 MM/H (ref 0–20)
GFR SERPL CREATININE-BSD FRML MDRD: >90 ML/MIN/{1.73_M2}
GLUCOSE SERPL-MCNC: 63 MG/DL (ref 70–99)
GLUCOSE UR STRIP-MCNC: NEGATIVE MG/DL
HCT VFR BLD AUTO: 44.2 % (ref 35–47)
HGB BLD-MCNC: 14.5 G/DL (ref 11.7–15.7)
HGB UR QL STRIP: NEGATIVE
IMM GRANULOCYTES # BLD: 0 10E9/L (ref 0–0.4)
IMM GRANULOCYTES NFR BLD: 0.5 %
KETONES UR STRIP-MCNC: NEGATIVE MG/DL
LEUKOCYTE ESTERASE UR QL STRIP: NEGATIVE
LIPASE SERPL-CCNC: 239 U/L (ref 73–393)
LYMPHOCYTES # BLD AUTO: 1.6 10E9/L (ref 0.8–5.3)
LYMPHOCYTES NFR BLD AUTO: 40.8 %
MCH RBC QN AUTO: 32.6 PG (ref 26.5–33)
MCHC RBC AUTO-ENTMCNC: 32.8 G/DL (ref 31.5–36.5)
MCV RBC AUTO: 99 FL (ref 78–100)
MONOCYTES # BLD AUTO: 0.4 10E9/L (ref 0–1.3)
MONOCYTES NFR BLD AUTO: 11.1 %
MUCOUS THREADS #/AREA URNS LPF: PRESENT /LPF
NEUTROPHILS # BLD AUTO: 1.7 10E9/L (ref 1.6–8.3)
NEUTROPHILS NFR BLD AUTO: 43.6 %
NITRATE UR QL: NEGATIVE
NRBC # BLD AUTO: 0 10*3/UL
NRBC BLD AUTO-RTO: 0 /100
PH UR STRIP: 6 PH (ref 5–7)
PLATELET # BLD AUTO: 168 10E9/L (ref 150–450)
POTASSIUM SERPL-SCNC: 4.2 MMOL/L (ref 3.4–5.3)
PROT SERPL-MCNC: 7.4 G/DL (ref 6.8–8.8)
RBC # BLD AUTO: 4.45 10E12/L (ref 3.8–5.2)
RBC #/AREA URNS AUTO: 1 /HPF (ref 0–2)
SODIUM SERPL-SCNC: 139 MMOL/L (ref 133–144)
SOURCE: ABNORMAL
SP GR UR STRIP: 1.01 (ref 1–1.03)
SQUAMOUS #/AREA URNS AUTO: 1 /HPF (ref 0–1)
UROBILINOGEN UR STRIP-MCNC: 0 MG/DL (ref 0–2)
WBC # BLD AUTO: 3.8 10E9/L (ref 4–11)
WBC #/AREA URNS AUTO: <1 /HPF (ref 0–5)

## 2019-06-07 ASSESSMENT — PAIN SCALES - GENERAL: PAINLEVEL: MILD PAIN (3)

## 2019-06-07 NOTE — PROGRESS NOTES
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  She presents today for evaluation of abdominal pain.  The pain was insidious onset 2 weeks ago which she woke up in the morning with it.  It is a vague pain in the mid to lower abdomen bilaterally.  There is no consistent aggravating or alleviating factors that she is been able to identify.  When she is busy and working and distracted she is not aware of it but she also feels that the pain fluctuates.  She has not noted any connection with alcohol caffeine and she does not smoke.  She has had no change in bowel movements other than that potential tendency towards constipation but there is no change of the symptoms with MiraLAX.  It has on occasion woken her up in the morning.  However this morning she woke up pain-free but then developed the discomfort after eating.  Said no nausea vomiting fever chills sweats diarrhea or other complaints.  She did skip her methotrexate last week because of illness in her children.  Otherwise she is been feeling well.  Past Medical History:   Diagnosis Date     Arthritis      Breast disorder     extra breast tissue under right armpit     Fertility problem     in-vitro x2 with 2 resulting pregnancies     Varicosities      Past Surgical History:   Procedure Laterality Date     NO HISTORY OF SURGERY       Family History   Problem Relation Age of Onset     Family History Negative Other      Cancer No family hx of      Social History     Socioeconomic History     Marital status:      Spouse name: None     Number of children: None     Years of education: None     Highest education level: None   Occupational History     None   Social Needs     Financial resource strain: None      Food insecurity:     Worry: None     Inability: None     Transportation needs:     Medical: None     Non-medical: None   Tobacco Use     Smoking status: Never Smoker     Smokeless tobacco: Never Used   Substance and Sexual Activity     Alcohol use: Yes     Alcohol/week: 1.2 oz     Types: 2 Standard drinks or equivalent per week     Drug use: No     Sexual activity: Yes     Partners: Male     Birth control/protection: IUD   Lifestyle     Physical activity:     Days per week: None     Minutes per session: None     Stress: None   Relationships     Social connections:     Talks on phone: None     Gets together: None     Attends Tenriism service: None     Active member of club or organization: None     Attends meetings of clubs or organizations: None     Relationship status: None     Intimate partner violence:     Fear of current or ex partner: None     Emotionally abused: None     Physically abused: None     Forced sexual activity: None   Other Topics Concern     None   Social History Narrative     None       Exam:  /77 (BP Location: Right arm, Patient Position: Sitting, Cuff Size: Adult Regular)   Pulse 73   Temp 98  F (36.7  C) (Oral)   Resp 16   Wt 55.7 kg (122 lb 12.8 oz)   Breastfeeding? No   BMI 19.95 kg/m    122 lbs 12.8 oz  PHYSICAL EXAMINATION:   The patient is alert, oriented with a clear sensorium.   Skin shows no lesions or rashes and good turgor.   Head is normocephalic and atraumatic.   Eyes show PERRLA. Fundi are benign.   Ears show normal TMs bilaterally.   Mouth shows clear oral mucosa.   Neck shows no nodes, thyromegaly or bruits.   Back is nontender.   Lungs are clear to percussion and auscultation.   Heart shows normal S1 and S2 without murmur or gallop.   Abdomen is soft, tender in the midepigastrium in both lower quadrants without masses or organomegaly.       ASSESSMENT  1 history of ovarian cysts  2 seronegative rheumatoid arthritis now off methotrexate for 2 weeks  3 abdominal pain  question related to #1 above    Plan  We will check her labs including checking for H. pylori and will check her urinalysis.  We will reimage her ovarian cysts as well as her abdomen.  Over 25 minutes spent with patient the majority in counseling and coordinating care.    This note was completed using Dragon voice recognition software.  Although reviewed after completion, some word and grammatical errors may occur.    Colt Dyson MD  General Internal Medicine  [facility]  [phone]

## 2019-06-10 ENCOUNTER — ANCILLARY PROCEDURE (OUTPATIENT)
Dept: ULTRASOUND IMAGING | Facility: CLINIC | Age: 39
End: 2019-06-10
Attending: INTERNAL MEDICINE
Payer: COMMERCIAL

## 2019-06-10 ENCOUNTER — ANCILLARY PROCEDURE (OUTPATIENT)
Dept: ULTRASOUND IMAGING | Facility: CLINIC | Age: 39
End: 2019-06-10
Attending: OBSTETRICS & GYNECOLOGY
Payer: COMMERCIAL

## 2019-06-10 DIAGNOSIS — R10.84 ABDOMINAL PAIN, GENERALIZED: ICD-10-CM

## 2019-06-10 DIAGNOSIS — N83.202 CYSTS OF BOTH OVARIES: ICD-10-CM

## 2019-06-10 DIAGNOSIS — K29.70 GASTRITIS WITHOUT BLEEDING, UNSPECIFIED CHRONICITY, UNSPECIFIED GASTRITIS TYPE: Primary | ICD-10-CM

## 2019-06-10 DIAGNOSIS — N83.201 CYSTS OF BOTH OVARIES: ICD-10-CM

## 2019-06-10 RX ORDER — OMEPRAZOLE 40 MG/1
40 CAPSULE, DELAYED RELEASE ORAL DAILY
Qty: 90 CAPSULE | Refills: 1 | Status: SHIPPED | OUTPATIENT
Start: 2019-06-10 | End: 2019-10-04

## 2019-06-18 DIAGNOSIS — M35.9 UNDIFFERENTIATED CONNECTIVE TISSUE DISEASE (H): ICD-10-CM

## 2019-06-20 LAB
DLCOCOR-%PRED-PRE: 111 %
DLCOCOR-PRE: 25.74 ML/MIN/MMHG
DLCOUNC-%PRED-PRE: 114 %
DLCOUNC-PRE: 26.57 ML/MIN/MMHG
DLCOUNC-PRED: 23.15 ML/MIN/MMHG
ERV-%PRED-PRE: 74 %
ERV-PRE: 1.03 L
ERV-PRED: 1.38 L
EXPTIME-PRE: 7.26 SEC
FEF2575-%PRED-POST: 104 %
FEF2575-%PRED-PRE: 65 %
FEF2575-POST: 3.45 L/SEC
FEF2575-PRE: 2.17 L/SEC
FEF2575-PRED: 3.3 L/SEC
FEFMAX-%PRED-PRE: 90 %
FEFMAX-PRE: 6.53 L/SEC
FEFMAX-PRED: 7.23 L/SEC
FEV1-%PRED-PRE: 86 %
FEV1-PRE: 2.75 L
FEV1FEV6-PRE: 75 %
FEV1FEV6-PRED: 84 %
FEV1FVC-PRE: 74 %
FEV1FVC-PRED: 82 %
FEV1SVC-PRE: 75 %
FEV1SVC-PRED: 82 %
FIFMAX-PRE: 5.71 L/SEC
FRCPLETH-%PRED-PRE: 125 %
FRCPLETH-PRE: 3.47 L
FRCPLETH-PRED: 2.78 L
FVC-%PRED-PRE: 95 %
FVC-PRE: 3.72 L
FVC-PRED: 3.91 L
IC-%PRED-PRE: 104 %
IC-PRE: 2.64 L
IC-PRED: 2.53 L
RVPLETH-%PRED-PRE: 147 %
RVPLETH-PRE: 2.44 L
RVPLETH-PRED: 1.66 L
TLCPLETH-%PRED-PRE: 116 %
TLCPLETH-PRE: 6.11 L
TLCPLETH-PRED: 5.22 L
VA-%PRED-PRE: 102 %
VA-PRE: 5.42 L
VC-%PRED-PRE: 93 %
VC-PRE: 3.67 L
VC-PRED: 3.91 L

## 2019-07-19 DIAGNOSIS — A94: Primary | ICD-10-CM
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
## 2019-07-19 RX ORDER — FLUOCINONIDE GEL 0.5 MG/G
GEL TOPICAL
Qty: 30 G | Refills: 11 | Status: SHIPPED | OUTPATIENT
Start: 2019-07-19 | End: 2022-03-31

## 2019-08-08 ENCOUNTER — TRANSFERRED RECORDS (OUTPATIENT)
Dept: HEALTH INFORMATION MANAGEMENT | Facility: CLINIC | Age: 39
End: 2019-08-08

## 2019-10-03 NOTE — PROGRESS NOTES
[facility]  Primary Care Center   Radha Antoine MD  10/04/2019      Chief Complaint:   Abdominal Pain       History of Present Illness:   Lexii Mendoza is a 39 year old female with a history of arthritis, a breast disorder, and varicosities who presents alone for evaluation of abdominal pain. She had a flare of abdominal pain in July which she believes was due to the increased stress she was experiencing at work as she took over as . She was concerned as she was experiencing lower abdominal pain in addition to her GERD. She did see Dr. Dyson and started high dose Prilosec which helped for some time but did not totally alleviated her symptoms. She then went to [facility] for further evaluation and had an EGD which was unremarkable. She also had a celiac test which was negative. Today she reports she is feeling much better and has noticed that carbonated beverages trigger her GI symptoms, thus she has been avoiding these. She also takes a Zantac prior to having a cocktail which helps keep her symptoms under control. She is currently back to taking her normal Prilosec dosage.     Breathing: She saw Shlomo Rodriguez of dermatology for another opinion on her skin findings and bruising. He reassured her that none of her skin findings were overly concerning. He did order PFTs which showed some obstruction. This was treated successfully with albuterol.     Other concerns discussed:  1. Flu shot today   2. IUD in place - no menses      Review of Systems:   Pertinent items are noted in HPI or as in patient entered ROS below, remainder of complete ROS is negative.     Active Medications:      adapalene (DIFFERIN) 0.1 % cream, To face at bedtime, Disp: 45 g, Rfl: 11     Etanercept (ENBREL SC), Inject 50 mg Subcutaneous once a week, Disp: , Rfl:      IBUPROFEN PO, Take 400 mg by mouth, Disp: , Rfl:      Multiple Vitamins-Minerals (MULTIVITAMIN ADULTS PO), , Disp: , Rfl:      Omeprazole Magnesium  (PRILOSEC OTC PO), Take by mouth as needed , Disp: , Rfl:      fluocinonide (LIDEX) 0.05 % external gel, Twice daily to arthropod bites as needed. (Patient not taking: Reported on 10/4/2019), Disp: 30 g, Rfl: 11     levonorgestrel (MIRENA) 20 MCG/24HR IUD, 1 each (20 mcg) by Intrauterine route once for 1 dose, Disp: 1 each, Rfl: 0      Allergies:   Sulfasalazine      Past Medical History:  Arthritis   Breast disorder (extra breast tissue under right armpit)  Fertility problem (in-vitro x2 with 2 resulting pregnancies)  Varicosities      Past Surgical History:  History reviewed. No pertinent past surgical history.      Family History:   History reviewed. No pertinent family history.        Social History:   The patient is , a nonsmoker, and does consume alcohol (2 standard drinks per week).        Physical Exam:   /74   Pulse 66   Resp 18   Wt 58.3 kg (128 lb 8 oz)   SpO2 96%   BMI 20.87 kg/m     Constitutional: Alert. In no distress.  Head: Normocephalic.   ENT: Mucous membranes are moist.   Respiratory: Normal rate and effort.  Gastrointestinal: Abdomen soft. Non-tender. BS normal. No masses or organomegaly.  Musculoskeletal: No gross deformities noted.   Psychiatric: Mentation appears normal. Normal affect.      Assessment and Plan:  1. Bronchospasm  PFTs indicated some obstruction which has been alleviated by albuterol in the past. 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   - albuterol (PROAIR HFA/PROVENTIL HFA/VENTOLIN HFA) 108 (90 Base) MCG/ACT inhaler  Dispense: 1 Inhaler; Refill: 11       Follow-up: Return in about 6 months (around 4/4/2020) for Routine Visit.         Scribe Disclosure:  I, Adore Fernandez, am serving as a scribe to document services personally performed by Radha Antoine MD at this visit, based upon the provider's statements to me. All documentation has been reviewed by the aforementioned provider prior to being  entered into the official medical record.     Portions of this medical record were completed by a scribe. UPON MY REVIEW AND AUTHENTICATION BY ELECTRONIC SIGNATURE, this confirms (a) I performed the applicable clinical services, and (b) the record is accurate.     --Victorina Antoine MD

## 2019-10-04 ENCOUNTER — OFFICE VISIT (OUTPATIENT)
Dept: INTERNAL MEDICINE | Facility: CLINIC | Age: 39
End: 2019-10-04
Payer: COMMERCIAL

## 2019-10-04 VITALS
BODY MASS INDEX: 20.87 KG/M2 | HEART RATE: 66 BPM | OXYGEN SATURATION: 96 % | DIASTOLIC BLOOD PRESSURE: 74 MMHG | SYSTOLIC BLOOD PRESSURE: 105 MMHG | WEIGHT: 128.5 LBS | RESPIRATION RATE: 18 BRPM

## 2019-10-04 DIAGNOSIS — J98.01 BRONCHOSPASM: Primary | ICD-10-CM

## 2019-10-04 RX ORDER — ALBUTEROL SULFATE 90 UG/1
2 AEROSOL, METERED RESPIRATORY (INHALATION) EVERY 4 HOURS PRN
Qty: 1 INHALER | Refills: 11 | Status: SHIPPED | OUTPATIENT
Start: 2019-10-04

## 2019-10-04 ASSESSMENT — PAIN SCALES - GENERAL: PAINLEVEL: NO PAIN (0)

## 2019-10-04 NOTE — NURSING NOTE
Chief Complaint   Patient presents with     Abdominal Pain     Pt is here to follow up on abdominal issues.      Mihaela Vargas LPN at 8:47 AM on 10/4/2019.

## 2019-10-04 NOTE — PATIENT INSTRUCTIONS
[facility] Medication Refill Request Information:  * Please contact your pharmacy regarding ANY request for medication refills.  ** [facility] Prescription Fax = [phone]  * Please allow 3 business days for routine medication refills.  * Please allow 5 business days for controlled substance medication refills.     [facility] Test Result notification information:  *You will be notified with in 7-10 days of your appointment day regarding the results of your test.  If you are on MyChart you will be notified as soon as the provider has reviewed the results and signed off on them.    [facility]: [phone]

## 2019-10-04 NOTE — NURSING NOTE
Lexii Mendoza      1.  Has the patient received the information for the influenza vaccine? YES    2.  Does the patient have any of the following contraindications?     Allergy to eggs? No     Allergic reaction to previous influenza vaccines? No     Any other problems to previous influenza vaccines? No     Paralyzed by Guillain-Phoenix syndrome? No     Currently pregnant? NO     Current moderate or severe illness? No     Allergy to contact lens solution? No    3.  The vaccine has been administered in the usual fashion and the patient was instructed to wait 20 minutes before leaving the building in the event of an allergic reaction: YES    Vaccination given by   Mihaela Crawford LPN at 8:48 AM on 10/4/2019.  .  Recorded by Mihaela Crawford LPN

## 2020-03-02 ENCOUNTER — HEALTH MAINTENANCE LETTER (OUTPATIENT)
Age: 40
End: 2020-03-02

## 2020-04-03 ENCOUNTER — VIRTUAL VISIT (OUTPATIENT)
Dept: INTERNAL MEDICINE | Facility: CLINIC | Age: 40
End: 2020-04-03
Payer: COMMERCIAL

## 2020-04-03 DIAGNOSIS — Z12.31 ENCOUNTER FOR SCREENING MAMMOGRAM FOR BREAST CANCER: Primary | ICD-10-CM

## 2020-04-03 DIAGNOSIS — Z00.00 ROUTINE GENERAL MEDICAL EXAMINATION AT A HEALTH CARE FACILITY: ICD-10-CM

## 2020-04-03 ASSESSMENT — PAIN SCALES - GENERAL: PAINLEVEL: NO PAIN (0)

## 2020-04-03 NOTE — NURSING NOTE
Chief Complaint   Patient presents with     Medication Follow-up     Pt is here to follow up on medications.      Mihaela Vargas LPN at 7:46 AM on 4/3/2020.

## 2020-04-03 NOTE — PROGRESS NOTES
"Subjective     Lexii Mendoza is a 40 year old female who is being evaluated via a billable telephone visit.      The patient has been notified of following:     \"This telephone visit will be conducted via a call between you and your physician/provider. We have found that certain health care needs can be provided without the need for a physical exam.  This service lets us provide the care you need with a short phone conversation.  If a prescription is necessary we can send it directly to your pharmacy.  If lab work is needed we can place an order for that and you can then stop by our lab to have the test done at a later time.    If during the course of the call the physician/provider feels a telephone visit is not appropriate, you will not be charged for this service.\"     Patient has given verbal consent for Telephone visit?  Yes    Lexii Mendoza calls with concerns of   Chief Complaint   Patient presents with     Medication Follow-up     Pt is here to follow up on medications.      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  They will experience a small pay decrease temporarily.  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   role has been extremely challenging.  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    Since our last visit, Oct 2019, she has been doing well in terms of her GI symptoms (GERD, negative EGD).  She stopped Zantac due to the FDA warnings and is managing this with lifestyle modification alone.  Avoids carbonated beverages which is a major trigger for her.    In terms of seronegative arthritis:  Held Embrel earlier this month (concerns regarding immune suppression and covid-19) and needed to redose in 3 days due to increased fatigue and joint pain.  Methotrexate did not work well, has stopped this.  Rarely takes ibuprofen but more often " for a headache.  Will see Rheumatology, Dr. Kimberli Moralez in two months.    Anxiety:  This has been an issue even before covid-19 because she was concerned about a lack of specific rheumatologic diagnosis (worrying about a paraneoplastic syndrome, etc) but that has gotten better over time.  Her coping strategies right now include long walks with her .    Shortness of breath:  Had PFTs as part of her rheumatologic workup and these showed mild airflow obstruction.  Has cough and chest tightness with prolonged talking and also after exercise.  Relieved by albuterol, takes it rarely.  Has noticed it more lately walking outside.  Discussed possible seasonal allergic component; could try a nasal steroid or cetirizine as an oral antihistamine to reduce symptoms and reduce albuterol use (it makes her jittery).    ALLERGIES  Sulfasalazine      Current Outpatient Medications:      adapalene (DIFFERIN) 0.1 % cream, To face at bedtime, Disp: 45 g, Rfl: 11     albuterol (PROAIR HFA/PROVENTIL HFA/VENTOLIN HFA) 108 (90 Base) MCG/ACT inhaler, Inhale 2 puffs into the lungs every 4 hours as needed for shortness of breath / dyspnea or wheezing, Disp: 1 Inhaler, Rfl: 11     Etanercept (ENBREL SC), Inject 50 mg Subcutaneous once a week, Disp: , Rfl:      IBUPROFEN PO, Take 400 mg by mouth, Disp: , Rfl:      levonorgestrel (MIRENA) 20 MCG/24HR IUD, 1 each (20 mcg) by Intrauterine route once for 1 dose, Disp: 1 each, Rfl: 0     Multiple Vitamins-Minerals (MULTIVITAMIN ADULTS PO), , Disp: , Rfl:      fluocinonide (LIDEX) 0.05 % external gel, Twice daily to arthropod bites as needed. (Patient not taking: Reported on 10/4/2019), Disp: 30 g, Rfl: 11      Assessment/Plan:  Lexii was seen today for medication follow-up.    She is also due for the following health care maintenance (to be done post covid)    Encounter for screening mammogram for breast cancer  -     Mammogram, screening w tamie (3D); Future    Routine general medical  examination at a health care facility  -     TDAP VACCINE (BOOSTRIX)      RTC 6 months      Phone call duration:  25 minutes    --Victorina Antoine MD

## 2020-05-18 DIAGNOSIS — Z20.822 EXPOSURE TO COVID-19 VIRUS: Primary | ICD-10-CM

## 2020-05-18 DIAGNOSIS — Z20.822 EXPOSURE TO COVID-19 VIRUS: ICD-10-CM

## 2020-05-18 PROCEDURE — 86769 SARS-COV-2 COVID-19 ANTIBODY: CPT | Performed by: DERMATOLOGY

## 2020-05-18 PROCEDURE — 36415 COLL VENOUS BLD VENIPUNCTURE: CPT | Performed by: DERMATOLOGY

## 2020-05-26 LAB
COVID-19 SPIKE RBD ABY TITER: NORMAL
COVID-19 SPIKE RBD ABY: NEGATIVE

## 2020-06-24 ENCOUNTER — ANCILLARY PROCEDURE (OUTPATIENT)
Dept: MAMMOGRAPHY | Facility: CLINIC | Age: 40
End: 2020-06-24
Attending: INTERNAL MEDICINE
Payer: COMMERCIAL

## 2020-06-24 DIAGNOSIS — Z12.31 ENCOUNTER FOR SCREENING MAMMOGRAM FOR BREAST CANCER: ICD-10-CM
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
## 2020-07-22 RX ORDER — FLUOCINONIDE GEL 0.5 MG/G
GEL TOPICAL
Qty: 30 G | Refills: 11 | OUTPATIENT
Start: 2020-07-22

## 2020-07-23 ENCOUNTER — TRANSFERRED RECORDS (OUTPATIENT)
Dept: HEALTH INFORMATION MANAGEMENT | Facility: CLINIC | Age: 40
End: 2020-07-23

## 2020-08-20 ENCOUNTER — VIRTUAL VISIT (OUTPATIENT)
Dept: FAMILY MEDICINE | Facility: OTHER | Age: 40
End: 2020-08-20
Payer: COMMERCIAL

## 2020-08-20 PROCEDURE — 99421 OL DIG E/M SVC 5-10 MIN: CPT | Performed by: EMERGENCY MEDICINE

## 2020-08-20 NOTE — PROGRESS NOTES
"Date: 2020 17:56:45  Clinician: Geovani Harrington  Clinician NPI: [id number]  Patient: Lexii Mendoza  Patient : 1980  Patient Address: [address], [place] [address]  Patient Phone: [phone]  Visit Protocol: URI  Patient Summary:  Lexii is a 40 year old ( : 1980 ) female who initiated a Visit for COVID-19 (Coronavirus) evaluation and screening. When asked the question \"Please sign me up to receive news, health information and promotions from [facility].\", Lexii responded \"No\".    Lexii states her symptoms started gradually 3-4 days ago.   Her symptoms consist of a headache, malaise, a sore throat, myalgia, and facial pain or pressure. Lexii also feels feverish but was unable to measure her temperature.   Symptom details     Sore throat: Lexii reports having severe throat pain (7-9 on a 10 point pain scale), does not have exudate on her tonsils, and can swallow liquids. The lymph nodes in her neck are not enlarged. A rash has not appeared on the skin since the sore throat started.     Facial pain or pressure: The facial pain or pressure does not feel worse when bending or leaning forward.     Headache: She states the headache is moderate (4-6 on a 10 point pain scale).      Lexii denies having ear pain, chills, enlarged lymph nodes, wheezing, teeth pain, ageusia, diarrhea, cough, nasal congestion, vomiting, rhinitis, nausea, and anosmia. She also denies having a sinus infection within the past year, having recent facial or sinus surgery in the past 60 days, taking antibiotic medication in the past month, and double sickening (worsening symptoms after initial improvement). She is not experiencing dyspnea.   Precipitating events  Within the past week, Lexii has not been exposed to someone with strep throat. She has not recently been exposed to someone with influenza. Lexii has been in close contact with the following high risk individuals: pregnant women, people with asthma, heart " disease or diabetes, immunocompromised people, and children under the age of 5.   Pertinent COVID-19 (Coronavirus) information  In the past 14 days, Lexii has not worked in a congregate living setting.   She either works or volunteers as a healthcare worker or a , or works or volunteers in a healthcare facility. 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 She lives with a healthcare worker.   Lexii has not had a close contact with a laboratory-confirmed COVID-19 patient within 14 days of symptom onset.   Since December 2019, Lexii and has not had upper respiratory infection or influenza-like illness. Has not been diagnosed with lab-confirmed COVID-19 test   Pertinent medical history  Lexii does not get yeast infections when she takes antibiotics.   Lexii does not need a return to work/school note.   Weight: 125 lbs   Lexii does not smoke or use smokeless tobacco.   She denies pregnancy and denies breastfeeding. She does not menstruate.   Additional information as reported by the patient (free text): I have RA and attributed Most of these symptoms to a flare but the headache is not typical and I'm more tired than with a usual flare so I'm worried about COVID   Weight: 125 lbs    MEDICATIONS: Enbrel subcutaneous, ALLERGIES: NKDA  Clinician Response:  Dear Lexii Shultz, yes you need to be tested. Please see below. AMAYA Harrington MD.      Your symptoms show that you may have coronavirus (COVID-19). This illness can cause fever, cough and trouble breathing. Many people get a mild case and get better on their own. Some people can get very sick.  What should I do?  We would like to test you for this virus.   1. Please call [phone] to schedule your visit. Explain that you were referred by [facility] to have a COVID-19 test. Be ready to share your  [facility].  The following will serve as your written order for this COVID Test, ordered by me, for the indication of suspected COVID [Z20.828]: The test will be ordered in [employer], our electronic health record, after you are scheduled. It will show as ordered and authorized by Gray Self MD.  Order: COVID-19 (Coronavirus) PCR for SYMPTOMATIC testing from [facility].      2. When it's time for your COVID test:  Stay at least 6 feet away from others. (If someone will drive you to your test, stay in the backseat, as far away from the  as you can.)   Cover your mouth and nose with a mask, tissue or washcloth.  Go straight to the testing site. Don't make any stops on the way there or back.      3.Starting now: Stay home and away from others (self-isolate) until:   You've had no fever---and no medicine that reduces fever---for one full day (24 hours). And...   Your other symptoms have gotten better. For example, your cough or breathing has improved. And...   At least 10 days have passed since your symptoms started.       During this time, don't leave the house except for testing or medical care.   Stay in your own room, even for meals. Use your own bathroom if you can.   Stay away from others in your home. No hugging, kissing or shaking hands. No visitors.  Don't go to work, school or anywhere else.    Clean \"high touch\" surfaces often (doorknobs, counters, handles, etc.). Use a household cleaning spray or wipes. You'll find a full list of  on the EPA website: www.epa.gov/pesticide-registration/list-n-disinfectants-use-against-sars-cov-2.   Cover your mouth and nose with a mask, tissue or washcloth to avoid spreading germs.  Wash your hands and face often. Use soap and water.  Caregivers in these groups are at risk for severe illness due to COVID-19:  o People 65 years and older  o People who live in a nursing home or long-term care facility  o People with chronic disease (lung, heart, cancer, " diabetes, kidney, liver, immunologic)  o People who have a weakened immune system, including those who:   Are in cancer treatment  Take medicine that weakens the immune system, such as corticosteroids  Had a bone marrow or organ transplant  Have an immune deficiency  Have poorly controlled HIV or AIDS  Are obese (body mass index of 40 or higher)  Smoke regularly   o Caregivers should wear gloves while washing dishes, handling laundry and cleaning bedrooms and bathrooms.  o Use caution when washing and drying laundry: Don't shake dirty laundry, and use the warmest water setting that you can.  o For more tips, go to www.cdc.gov/coronavirus/2019-ncov/downloads/10Things.pdf.    4.Sign up for [facility]. We know it's scary to hear that you might have COVID-19. We want to track your symptoms to make sure you're okay over the next 2 weeks. Please look for an email from [employer]---this is a free, online program that we'll use to keep in touch. To sign up, follow the link in the email. Learn more at [email or web address]  How can I take care of myself?   Get lots of rest. Drink extra fluids (unless a doctor has told you not to).   Take Tylenol (acetaminophen) for fever or pain. If you have liver or kidney problems, ask your family doctor if it's okay to take Tylenol.   Adults can take either:    650 mg (two 325 mg pills) every 4 to 6 hours, or...   1,000 mg (two 500 mg pills) every 8 hours as needed.    Note: Don't take more than 3,000 mg in one day. Acetaminophen is found in many medicines (both prescribed and over-the-counter medicines). Read all labels to be sure you don't take too much.   For children, check the Tylenol bottle for the right dose. The dose is based on the child's age or weight.    If you have other health problems (like cancer, heart failure, an organ transplant or severe kidney disease): Call your specialty clinic if you don't feel better in the next 2 days.       Know when to call 911.  Emergency warning signs include:    Trouble breathing or shortness of breath Pain or pressure in the chest that doesn't go away Feeling confused like you haven't felt before, or not being able to wake up Bluish-colored lips or face.  Where can I get more information?   [facility] -- About COVID-19: [email or web address]   CDC -- What to Do If You're Sick: www.cdc.gov/coronavirus/2019-ncov/about/steps-when-sick.html   CDC -- Ending Home Isolation: www.cdc.gov/coronavirus/2019-ncov/hcp/disposition-in-home-patients.html   [facility] -- Caring for Someone: www.cdc.gov/coronavirus/2019-ncov/if-you-are-sick/care-for-someone.html   [facility] -- Interim Guidance for Hospital Discharge to Home: www.health.[facility].[place]./diseases/coronavirus/hcp/hospdischarge.pdf   [facility] clinical trials (COVID-19 research studies): clinicalaffairs.[facility]-clinical-trials    Below are the COVID-19 hotlines at the [place] Department of Health ([facility]). Interpreters are available.    For health questions: Call 642-291-6794 or 1-103.444.6700 (7 a.m. to 7 p.m.) For questions about schools and childcare: Call 570-559-7032 or 1-884.224.6817 (7 a.m. to 7 p.m.)      Diagnosis: Cough  Diagnosis ICD: R05

## 2020-08-21 ENCOUNTER — ALLIED HEALTH/NURSE VISIT (OUTPATIENT)
Dept: NURSING | Facility: CLINIC | Age: 40
End: 2020-08-21
Attending: DERMATOLOGY
Payer: COMMERCIAL

## 2020-08-21 DIAGNOSIS — Z20.822 EXPOSURE TO COVID-19 VIRUS: Primary | ICD-10-CM

## 2020-08-21 DIAGNOSIS — Z20.822 EXPOSURE TO COVID-19 VIRUS: ICD-10-CM

## 2020-08-21 PROCEDURE — U0003 INFECTIOUS AGENT DETECTION BY NUCLEIC ACID (DNA OR RNA); SEVERE ACUTE RESPIRATORY SYNDROME CORONAVIRUS 2 (SARS-COV-2) (CORONAVIRUS DISEASE [COVID-19]), AMPLIFIED PROBE TECHNIQUE, MAKING USE OF HIGH THROUGHPUT TECHNOLOGIES AS DESCRIBED BY CMS-2020-01-R: HCPCS | Performed by: DERMATOLOGY

## 2020-08-22 LAB
SARS-COV-2 RNA SPEC QL NAA+PROBE: NOT DETECTED
SPECIMEN SOURCE: NORMAL

## 2020-09-03 DIAGNOSIS — B86 SCABIES: Primary | ICD-10-CM

## 2020-09-03 RX ORDER — PERMETHRIN 50 MG/G
CREAM TOPICAL
Qty: 60 G | Refills: 1 | Status: SHIPPED | OUTPATIENT
Start: 2020-09-03 | End: 2021-11-08

## 2020-11-24 ENCOUNTER — ALLIED HEALTH/NURSE VISIT (OUTPATIENT)
Dept: NURSING | Facility: CLINIC | Age: 40
End: 2020-11-24
Attending: DERMATOLOGY
Payer: COMMERCIAL

## 2020-11-24 DIAGNOSIS — Z20.822 EXPOSURE TO COVID-19 VIRUS: ICD-10-CM

## 2020-11-24 DIAGNOSIS — Z20.822 EXPOSURE TO COVID-19 VIRUS: Primary | ICD-10-CM

## 2020-11-24 LAB
SARS-COV-2 RNA SPEC QL NAA+PROBE: NORMAL
SPECIMEN SOURCE: NORMAL

## 2020-11-24 PROCEDURE — U0003 INFECTIOUS AGENT DETECTION BY NUCLEIC ACID (DNA OR RNA); SEVERE ACUTE RESPIRATORY SYNDROME CORONAVIRUS 2 (SARS-COV-2) (CORONAVIRUS DISEASE [COVID-19]), AMPLIFIED PROBE TECHNIQUE, MAKING USE OF HIGH THROUGHPUT TECHNOLOGIES AS DESCRIBED BY CMS-2020-01-R: HCPCS | Performed by: DERMATOLOGY

## 2020-11-25 LAB
LABORATORY COMMENT REPORT: NORMAL
SARS-COV-2 RNA SPEC QL NAA+PROBE: NEGATIVE
SPECIMEN SOURCE: NORMAL

## 2020-12-11 ENCOUNTER — TRANSFERRED RECORDS (OUTPATIENT)
Dept: HEALTH INFORMATION MANAGEMENT | Facility: CLINIC | Age: 40
End: 2020-12-11

## 2020-12-14 ENCOUNTER — HEALTH MAINTENANCE LETTER (OUTPATIENT)
Age: 40
End: 2020-12-14

## 2021-03-12 ENCOUNTER — TRANSFERRED RECORDS (OUTPATIENT)
Dept: HEALTH INFORMATION MANAGEMENT | Facility: CLINIC | Age: 41
End: 2021-03-12

## 2021-04-18 ENCOUNTER — HEALTH MAINTENANCE LETTER (OUTPATIENT)
Age: 41
End: 2021-04-18

## 2021-06-23 ENCOUNTER — TRANSFERRED RECORDS (OUTPATIENT)
Dept: HEALTH INFORMATION MANAGEMENT | Facility: CLINIC | Age: 41
End: 2021-06-23

## 2021-06-25 ENCOUNTER — ANCILLARY PROCEDURE (OUTPATIENT)
Dept: MAMMOGRAPHY | Facility: CLINIC | Age: 41
End: 2021-06-25
Attending: INTERNAL MEDICINE
Payer: COMMERCIAL

## 2021-06-25 DIAGNOSIS — Z12.31 VISIT FOR SCREENING MAMMOGRAM: ICD-10-CM

## 2021-06-25 PROCEDURE — 77063 BREAST TOMOSYNTHESIS BI: CPT | Mod: GC | Performed by: RADIOLOGY

## 2021-06-25 PROCEDURE — 77067 SCR MAMMO BI INCL CAD: CPT | Mod: GC | Performed by: RADIOLOGY

## 2021-07-16 ENCOUNTER — RECORDS - HEALTHEAST (OUTPATIENT)
Dept: ADMINISTRATIVE | Facility: CLINIC | Age: 41
End: 2021-07-16

## 2021-10-02 ENCOUNTER — HEALTH MAINTENANCE LETTER (OUTPATIENT)
Age: 41
End: 2021-10-02

## 2021-11-08 ENCOUNTER — VIRTUAL VISIT (OUTPATIENT)
Dept: INTERNAL MEDICINE | Facility: CLINIC | Age: 41
End: 2021-11-08
Payer: COMMERCIAL

## 2021-11-08 DIAGNOSIS — G43.909 MIGRAINE WITHOUT STATUS MIGRAINOSUS, NOT INTRACTABLE, UNSPECIFIED MIGRAINE TYPE: Primary | ICD-10-CM

## 2021-11-08 PROCEDURE — 99214 OFFICE O/P EST MOD 30 MIN: CPT | Mod: 95 | Performed by: INTERNAL MEDICINE

## 2021-11-08 RX ORDER — RIZATRIPTAN BENZOATE 5 MG/1
5 TABLET, ORALLY DISINTEGRATING ORAL
Qty: 15 TABLET | Refills: 4 | Status: SHIPPED | OUTPATIENT
Start: 2021-11-08 | End: 2022-04-11

## 2021-11-08 RX ORDER — SECUKINUMAB 150 MG/ML
150 INJECTION SUBCUTANEOUS
COMMUNITY
Start: 2021-11-01

## 2021-11-08 NOTE — PROGRESS NOTES
"Lexii is a very pleasant 41 year old who is being evaluated via a billable video visit.        Subjective   Lexii presents for the following health issues: headaches    HPI     \"Not usually a headache person\" but Lexii has been experiencing headaches for the past several months.  One lasted several days.  Needed to lie down in the cabin on a ski trip last March; NSAIDs, sleep seem to help somewhat but not as effective as she would anticipate.  In July, had another three day long headache, then another 1-2 weeks ago.   The headache is usually diffuse, \"the entire head\" but one time recently it was only right sided.  Has a history of seronegative arthritis and was wondering if this was related in terms of an autoimmune phenomenon.  Has neck and right shoulder pain for example which may potentially trigger a headache; however, this neck and shoulder pain has been much less lately, so can't really connect the two.   Does not usually have a menstrual cycle (with an IUD in place) but had spotting after two of the headaches.  Overdue to have Mirena replaced, made an appointment with Gyn (2 years past).  Has cut down on red meat and also alcohol more so due to other symptoms (inflammation).  Hasn't noticed any food triggers other than white wine.     Review of Systems    ROS: 10 point ROS neg other than the symptoms noted above in the HPI.      Objective           Vitals:  No vitals were obtained today due to virtual visit.    Physical Exam   GENERAL: Healthy, alert and no distress  EYES: Eyes grossly normal to inspection.  No discharge or erythema, or obvious scleral/conjunctival abnormalities.  RESP: No audible wheeze, cough, or visible cyanosis.  No visible retractions or increased work of breathing.    SKIN: Visible skin clear. No significant rash, abnormal pigmentation or lesions.  NEURO: Cranial nerves grossly intact.  Mentation and speech appropriate for age.  PSYCH: Mentation appears normal, affect normal/bright, " judgement and insight intact, normal speech and appearance well-groomed.    A/P Lexii has what sounds like recent onset migraines.  Perhaps hormonally medicated--either early perimenopause, or perhaps related to  Mirena IUD.  Offered trial of rizatriptan.  Would consider Neurology referral if this is ineffective.  Recommend keeping a food diary, and replacing IUD as already scheduled.    Victorina Garcia MD      Video-Visit Details    Type of service:  Video Visit started 9:32 ended 9:49 with another 15 minutes chart review and documentation same day    Originating Location (pt. Location): Home    Distant Location (provider location):  [facility] INTERNAL MEDICINE [place]     Platform used for Video Visit: [employer]

## 2021-11-19 ENCOUNTER — OFFICE VISIT (OUTPATIENT)
Dept: OBGYN | Facility: CLINIC | Age: 41
End: 2021-11-19
Payer: COMMERCIAL

## 2021-11-19 VITALS
DIASTOLIC BLOOD PRESSURE: 77 MMHG | HEART RATE: 68 BPM | BODY MASS INDEX: 19.62 KG/M2 | WEIGHT: 120.8 LBS | SYSTOLIC BLOOD PRESSURE: 121 MMHG

## 2021-11-19 DIAGNOSIS — Z30.430 ENCOUNTER FOR IUD INSERTION: ICD-10-CM

## 2021-11-19 DIAGNOSIS — Z30.433 ENCOUNTER FOR REMOVAL AND REINSERTION OF INTRAUTERINE CONTRACEPTIVE DEVICE: Primary | ICD-10-CM

## 2021-11-19 LAB — HCG UR QL: NEGATIVE

## 2021-11-19 PROCEDURE — 58301 REMOVE INTRAUTERINE DEVICE: CPT | Performed by: OBSTETRICS & GYNECOLOGY

## 2021-11-19 PROCEDURE — 58300 INSERT INTRAUTERINE DEVICE: CPT | Performed by: OBSTETRICS & GYNECOLOGY

## 2021-11-19 PROCEDURE — 81025 URINE PREGNANCY TEST: CPT | Performed by: OBSTETRICS & GYNECOLOGY

## 2021-11-19 NOTE — PROGRESS NOTES
SUBJECTIVE:    Is a pregnancy test required: Yes.  Was it positive or negative?  Negative  Was a consent obtained?  Yes    Subjective: Lexii Mendoza is a 41 year old  presents for IUD and desires mirena type IUD.  She requests removal of the IUD because she has started having cyclic migraines, just wants to make sure it is not IUD running out of hormone    Patient has been given the opportunity to ask questions about all forms of birth control, including all options appropriate for Lexii Mendoza. Discussed that no method of birth control, except abstinence is 100% effective against pregnancy or sexually transmitted infection.     Lexii Mendoza understands she may have the IUD removed at any time. IUD should be removed by a health care provider and the current IUD will be removed today.    The entire removal and insertion procedure was reviewed with the patient, including care after placement.    Today's PHQ-2 Score:   PHQ-2 (  [employer]) 2019   Q1: Little interest or pleasure in doing things 0   Q2: Feeling down, depressed or hopeless 0   PHQ-2 Score 0   PHQ-2 Total Score (12-17 Years)- Positive if 3 or more points; Administer PHQ-A if positive 0   Q1: Little interest or pleasure in doing things -   Q2: Feeling down, depressed or hopeless -   PHQ-2 Score -       PROCEDURE:    Premedicated with ibuprofen.  A speculum exam was performed and the cervix was visualized. The IUD string was visualized. Using ring forceps, the string  was grasped and the IUD removed intact.    Under sterile technique, cervix was visualized with speculum and prepped with Betadine solution swab x 3. Tenaculum was placed for stability. The uterus was gently straightened and sounded to 7.0 cm. IUD prepared for placement, and IUD inserted according to 's instructions without difficulty or significant ressitance, and deployed at the fundus. The strings were visualized and trimmed to 3.5 cm from the external os.  Tenaculum was removed and hemostasis noted. Speculum removed.  Patient tolerated procedure well.    EBL: minimal    Complications: none      POST PROCEDURE:    Given 's handouts, including when to have IUD removed, list of danger s/sx, side effects and follow up recommended. Encouraged condom use for prevention of STD. Advised to call for any fever, for prolonged or severe pain or bleeding, abnormal vaginal dischage, or unable to palpate strings. She was advised to use pain medications (ibuprofen) as needed for mild to moderate pain. Advised to follow-up in clinic in 4-6 weeks for IUD string check if unable to find strings or as directed by provider.     Zoila Harden MD

## 2021-11-19 NOTE — PATIENT INSTRUCTIONS

## 2022-03-30 ENCOUNTER — TELEPHONE (OUTPATIENT)
Dept: DERMATOLOGY | Facility: CLINIC | Age: 42
End: 2022-03-30
Payer: COMMERCIAL

## 2022-03-30 NOTE — PROGRESS NOTES
Pediatric Dermatology- Review of Systems Questions (return patient)          Goal for today's visit? Check in / refills     IN THE LAST 2 WEEKS     Fever- n     Mouth/Throat Sores- n/n     Weight Gain/Loss - n/n     Cough/Wheezing- n/n     Change in Appetite- n     Chest Discomfort/Heartburn - n/n     Bone Pain- n     Nausea/Vomiting - n/n     Joint Pain/Swelling - n/n     Constipation/Diarrhea - n/n     Headaches/Dizziness/Change in Vision- yes/n/n     Pain with Urination- n     Ear Pain/Hearing Loss- n/n     Nasal Discharge/Bleeding- n/n     Sadness/Irritability- n/n     Anxiety/Moodiness-n/n

## 2022-03-31 ENCOUNTER — VIRTUAL VISIT (OUTPATIENT)
Dept: DERMATOLOGY | Facility: CLINIC | Age: 42
End: 2022-03-31
Attending: DERMATOLOGY
Payer: COMMERCIAL

## 2022-03-31 DIAGNOSIS — L70.0 ACNE VULGARIS: ICD-10-CM
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
## 2022-03-31 DIAGNOSIS — L21.9 DERMATITIS, SEBORRHEIC: Primary | ICD-10-CM

## 2022-03-31 PROCEDURE — 99214 OFFICE O/P EST MOD 30 MIN: CPT | Mod: GQ | Performed by: DERMATOLOGY

## 2022-03-31 RX ORDER — MOMETASONE FUROATE 1 MG/ML
SOLUTION TOPICAL
Qty: 60 ML | Refills: 11 | Status: SHIPPED | OUTPATIENT
Start: 2022-03-31 | End: 2024-03-04

## 2022-03-31 RX ORDER — ADAPALENE 0.1 G/100G
CREAM TOPICAL
Qty: 45 G | Refills: 11 | Status: SHIPPED | OUTPATIENT
Start: 2022-03-31 | End: 2022-07-28

## 2022-03-31 RX ORDER — FLUOCINONIDE GEL 0.5 MG/G
GEL TOPICAL
Qty: 30 G | Refills: 11 | Status: SHIPPED | OUTPATIENT
Start: 2022-03-31 | End: 2023-08-24

## 2022-03-31 NOTE — PROGRESS NOTES
[facility] ([facility] Forward ((National Emergency Concerning the CORONAVIRUS (COVID 19), preferred for return patients. )    Image quality and interpretability: acceptable    Physician has received verbal consent for a Video/Photos Visit from the patient? Yes    In-person dermatology visit recommendation: no    Consent has been obtained for this service by 1 care team member: yes.     Teledermatology information:  - Location of patient: Home  - Location of teledermatologist:  ([facility] (Dr. Rowe, [place], [place])  - Reason teledermatology is appropriate:  of National Emergency Regarding Coronavirus disease (COVID 19) Outbreak  - Method of transmission:  Store and Forward ((National Emergency Concerning the CORONAVIRUS (COVID 19), preferred for return patients.   - Date of images: 03/31/22  - Service start time:0800  - Service end time: 0805  - Additional time spent on day of service: None  - Date of report: 03/31/22    ___________________________________________________________________________      DPL:  1. Acne vulgaris, lower facial. Topical adapalene. Past spironolactone.   2. Seborrheic dermatitis with predominant pruritus.   3. DSAP- past history of PDT      ASSESSMENT AND PLAN:   1.  Acne vulgaris, lower facial, suspect hormonal component.  Currently well managed on topical adapalene 0.1% cream at bedtime.  Noted that future considerations could include Retin-A Micro or Aklief.  2.  Seborrheic dermatitis with predominant pruritus.  Pruritus may also be secondary to underlying rheumatoid arthritis diagnosis.  Currently, doing well with mometasone solution 1 time per week.  Future considerations could include topical gabapentin.  3.  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  Past treatment approximately 10 years ago with photodynamic therapy.  Discussed consideration for repeating treatment.  The patient states that she will consider  this as a future treatment.    The patient to return to clinic in 1 year's time, sooner if concern.    Syeda Rowe MD   of Dermatology  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  She continues to struggle with lower facial acne.  She states that adapalene cream is helpful with consistent use.  She notes scalp itch.  Using mometasone solution 1 time per week is helpful.  She wonders if her underlying rheumatoid arthritis diagnosis has worsened and contributed to the underlying itch.  She spends weekends at her cabin in the summer where she tends to develop exuberant arthropod bite reactions.  She requests a refill on her fluocinonide gel to help with arthropod bites.    Patient Active Problem List   Diagnosis     NO ACTIVE PROBLEMS     Encounter for removal and reinsertion of intrauterine contraceptive device       Allergies   Allergen Reactions     Sulfasalazine Rash     Rash and elevated LFT         Current Outpatient Medications   Medication     adapalene (DIFFERIN) 0.1 % external cream     fluocinonide (LIDEX) 0.05 % external gel     mometasone (ELOCON) 0.1 % external solution     albuterol (PROAIR HFA/PROVENTIL HFA/VENTOLIN HFA) 108 (90 Base) MCG/ACT inhaler     COSENTYX SENSOREADY  MG/ML Sensoready pen     IBUPROFEN PO     levonorgestrel (MIRENA) 20 MCG/24HR IUD     Multiple Vitamins-Minerals (MULTIVITAMIN ADULTS PO)     rizatriptan (MAXALT-MLT) 5 MG ODT     No current facility-administered medications for this visit.       PHYSICAL EXAMINATION:  Focused on photos of the scalp and face.  -- Examination of the scalp shows very mild white scaling.  Examination of the chin shows scattered but few pink to flesh-colored papules.

## 2022-03-31 NOTE — LETTER
3/31/2022      RE: Lexii Mendoza  [address]  [place] [address]       Pediatric Dermatology- Review of Systems Questions (return patient)          Goal for today's visit? Check in / refills     IN THE LAST 2 WEEKS     Fever- n     Mouth/Throat Sores- n/n     Weight Gain/Loss - n/n     Cough/Wheezing- n/n     Change in Appetite- n     Chest Discomfort/Heartburn - n/n     Bone Pain- n     Nausea/Vomiting - n/n     Joint Pain/Swelling - n/n     Constipation/Diarrhea - n/n     Headaches/Dizziness/Change in Vision- yes/n/n     Pain with Urination- n     Ear Pain/Hearing Loss- n/n     Nasal Discharge/Bleeding- n/n     Sadness/Irritability- n/n     Anxiety/Moodiness-n/n         [facility] (Store and Forward ((National Emergency Concerning the CORONAVIRUS (COVID 19), preferred for return patients. )    Image quality and interpretability: acceptable    Physician has received verbal consent for a Video/Photos Visit from the patient? Yes    In-person dermatology visit recommendation: no    Consent has been obtained for this service by 1 care team member: yes.     Teledermatology information:  - Location of patient: Home  - Location of teledermatologist:  ([facility] (Dr. Rowe, [place], [place])  - Reason teledermatology is appropriate:  of National Emergency Regarding Coronavirus disease (COVID 19) Outbreak  - Method of transmission:  Store and Forward ((National Emergency Concerning the CORONAVIRUS (COVID 19), preferred for return patients.   - Date of images: 03/31/22  - Service start time:0800  - Service end time: 0805  - Additional time spent on day of service: None  - Date of report: 03/31/22    ___________________________________________________________________________      DPL:  1. Acne vulgaris, lower facial. Topical adapalene. Past spironolactone.   2. Seborrheic dermatitis with predominant pruritus.   3. DSAP- past history of PDT      ASSESSMENT AND  PLAN:   1.  Acne vulgaris, lower facial, suspect hormonal component.  Currently well managed on topical adapalene 0.1% cream at bedtime.  Noted that future considerations could include Retin-A Micro or Aklief.  2.  Seborrheic dermatitis with predominant pruritus.  Pruritus may also be secondary to underlying rheumatoid arthritis diagnosis.  Currently, doing well with mometasone solution 1 time per week.  Future considerations could include topical gabapentin.  3.  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  Past treatment approximately 10 years ago with photodynamic therapy.  Discussed consideration for repeating treatment.  The patient states that she will consider this as a future treatment.    The patient to return to clinic in 1 year's time, sooner if concern.    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  She continues to struggle with lower facial acne.  She states that adapalene cream is helpful with consistent use.  She notes scalp itch.  Using mometasone solution 1 time per week is helpful.  She wonders if her underlying rheumatoid arthritis diagnosis has worsened and contributed to the underlying itch.  She [place] cabin in the [place] where she tends to develop exuberant arthropod bite reactions.  She requests a refill on her fluocinonide gel to help with arthropod bites.    Patient Active Problem List   Diagnosis     NO ACTIVE PROBLEMS     Encounter for removal and reinsertion of intrauterine contraceptive device       Allergies   Allergen Reactions     Sulfasalazine Rash     Rash and elevated LFT         Current Outpatient Medications   Medication     adapalene (DIFFERIN) 0.1 % external cream     fluocinonide (LIDEX)  0.05 % external gel     mometasone (ELOCON) 0.1 % external solution     albuterol (PROAIR HFA/PROVENTIL HFA/VENTOLIN HFA) 108 (90 Base) MCG/ACT inhaler     COSENTYX SENSOREADY  MG/ML Sensoready pen     IBUPROFEN PO     levonorgestrel (MIRENA) 20 MCG/24HR IUD     Multiple Vitamins-Minerals (MULTIVITAMIN ADULTS PO)     rizatriptan (MAXALT-MLT) 5 MG ODT     No current facility-administered medications for this visit.       PHYSICAL EXAMINATION:  Focused on photos of the scalp and face.  -- Examination of the scalp shows very mild white scaling.  Examination of the chin shows scattered but few pink to flesh-colored papules.          Syeda Rowe MD

## 2022-04-04 ENCOUNTER — TELEPHONE (OUTPATIENT)
Dept: DERMATOLOGY | Facility: CLINIC | Age: 42
End: 2022-04-04
Payer: COMMERCIAL

## 2022-04-05 NOTE — TELEPHONE ENCOUNTER
PA Initiation    Medication: adapalene (DIFFERIN) 0.1 % external cream   Insurance Company: [employer] - Phone [phone] Fax [phone]  Pharmacy Filling the Rx: [id number] - [place], [place] - [address] AT [facility] [address]  Filling Pharmacy Phone: [phone]  Filling Pharmacy Fax: [phone]  Start Date: 4/5/2022

## 2022-04-06 NOTE — TELEPHONE ENCOUNTER
Prior Authorization Approval    Authorization Effective Date: 4/5/2022  Authorization Expiration Date: 4/5/2023  Medication: adapalene (DIFFERIN) 0.1 % external cream--APPROVED  Approved Dose/Quantity:   Reference #:     Insurance Company: [employer] - Phone [phone] Fax [phone]  Expected CoPay:       CoPay Card Available:      Foundation Assistance Needed:    Which Pharmacy is filling the prescription (Not needed for infusion/clinic administered): [id number] - [place], [place] - [address] AT [facility] [address]  Pharmacy Notified: Yes  Patient Notified: Yes **Instructed pharmacy to notify patient when script is ready to /ship.**

## 2022-04-11 ENCOUNTER — TRANSFERRED RECORDS (OUTPATIENT)
Dept: HEALTH INFORMATION MANAGEMENT | Facility: CLINIC | Age: 42
End: 2022-04-11

## 2022-04-11 LAB
ALT SERPL-CCNC: 21 IU/L (ref 5–35)
AST SERPL-CCNC: 28 U/L (ref 5–34)
CREATININE (EXTERNAL): 0.72 MG/DL (ref 0.5–1.3)
GFR ESTIMATED (EXTERNAL): 94 ML/MIN/1.73M2

## 2022-05-14 ENCOUNTER — HEALTH MAINTENANCE LETTER (OUTPATIENT)
Age: 42
End: 2022-05-14

## 2022-07-13 ENCOUNTER — ANCILLARY PROCEDURE (OUTPATIENT)
Dept: MAMMOGRAPHY | Facility: CLINIC | Age: 42
End: 2022-07-13
Attending: INTERNAL MEDICINE
Payer: COMMERCIAL

## 2022-07-13 DIAGNOSIS — Z12.31 VISIT FOR SCREENING MAMMOGRAM: ICD-10-CM

## 2022-07-13 PROCEDURE — 77067 SCR MAMMO BI INCL CAD: CPT | Mod: GC

## 2022-07-13 PROCEDURE — 77063 BREAST TOMOSYNTHESIS BI: CPT | Mod: GC

## 2022-07-28 DIAGNOSIS — L70.0 ACNE VULGARIS: ICD-10-CM

## 2022-07-28 RX ORDER — ADAPALENE 0.1 G/100G
CREAM TOPICAL
Qty: 45 G | Refills: 11 | Status: SHIPPED | OUTPATIENT
Start: 2022-07-28

## 2022-07-28 NOTE — TELEPHONE ENCOUNTER
Refill requested for adapalene. Routing to Dr. Rowe to approve or deny.    Carmencita Rod, [facility]

## 2022-08-23 DIAGNOSIS — L29.9 PRURITUS: ICD-10-CM

## 2022-08-23 DIAGNOSIS — Z20.7 SCABIES EXPOSURE: Primary | ICD-10-CM

## 2022-08-23 RX ORDER — PERMETHRIN 50 MG/G
CREAM TOPICAL
Qty: 60 G | Refills: 1 | Status: SHIPPED | OUTPATIENT
Start: 2022-08-23 | End: 2022-10-28

## 2022-08-29 ENCOUNTER — TRANSFERRED RECORDS (OUTPATIENT)
Dept: HEALTH INFORMATION MANAGEMENT | Facility: CLINIC | Age: 42
End: 2022-08-29

## 2022-08-29 LAB
ALT SERPL-CCNC: 17 IU/L (ref 5–35)
AST SERPL-CCNC: 30 U/L (ref 5–34)
CREATININE (EXTERNAL): 0.71 MG/DL (ref 0.5–1.3)

## 2022-09-03 ENCOUNTER — HEALTH MAINTENANCE LETTER (OUTPATIENT)
Age: 42
End: 2022-09-03

## 2022-09-29 ENCOUNTER — VIRTUAL VISIT (OUTPATIENT)
Dept: NEUROLOGY | Facility: CLINIC | Age: 42
End: 2022-09-29
Payer: COMMERCIAL

## 2022-09-29 DIAGNOSIS — M25.511 RIGHT SHOULDER PAIN, UNSPECIFIED CHRONICITY: ICD-10-CM

## 2022-09-29 DIAGNOSIS — G43.001 MIGRAINE WITHOUT AURA AND WITH STATUS MIGRAINOSUS, NOT INTRACTABLE: Primary | ICD-10-CM

## 2022-09-29 PROCEDURE — 99204 OFFICE O/P NEW MOD 45 MIN: CPT | Mod: 95 | Performed by: PSYCHIATRY & NEUROLOGY

## 2022-09-29 RX ORDER — RIZATRIPTAN BENZOATE 10 MG/1
10 TABLET, ORALLY DISINTEGRATING ORAL
Qty: 18 TABLET | Refills: 11 | Status: SHIPPED | OUTPATIENT
Start: 2022-09-29 | End: 2023-11-12

## 2022-09-29 ASSESSMENT — HEADACHE IMPACT TEST (HIT 6)
HOW OFTEN HAVE YOU FELT TOO TIRED TO WORK BECAUSE OF YOUR HEADACHES: RARELY
HIT6 TOTAL SCORE: 62
HOW OFTEN HAVE YOU FELT FED UP OR IRRITATED BECAUSE OF YOUR HEADACHES: SOMETIMES
WHEN YOU HAVE HEADACHES HOW OFTEN IS THE PAIN SEVERE: VERY OFTEN
HOW OFTEN DID HEADACHS LIMIT CONCENTRATION ON WORK OR DAILY ACTIVITY: SOMETIMES
HOW OFTEN DO HEADACHES LIMIT YOUR DAILY ACTIVITIES: SOMETIMES
WHEN YOU HAVE A HEADACHE HOW OFTEN DO YOU WISH YOU COULD LIE DOWN: ALWAYS

## 2022-09-29 ASSESSMENT — MIGRAINE DISABILITY ASSESSMENT (MIDAS)
HOW OFTEN WERE SOCIAL ACTIVITIES MISSED DUE TO HEADACHES: 1
TOTAL SCORE: 11
HOW MANY DAYS IN THE PAST 3 MONTHS HAVE YOU HAD A HEADACHE: 15
HOW MANY DAYS WAS HOUSEWORK PRODUCTIVITY CUT IN HALF DUE TO HEADACHES: 5
HOW MANY DAYS DID YOU MISS WORK OR SCHOOL BECAUSE OF HEADACHES: 0
ON A SCALE FROM 0-10 ON AVERAGE HOW PAINFUL WERE HEADACHES: 7
HOW MANY DAYS DID YOU NOT DO HOUSEWORK BECAUSE OF HEADACHES: 3
HOW MANY DAYS WAS YOUR PRODUCTIVITY CUT IN HALF BECAUSE OF HEADACHES: 2

## 2022-09-29 NOTE — PROGRESS NOTES
Lexii is a 42 year old who is being evaluated via a billable video visit.      How would you like to obtain your AVS? MyChart  If the video visit is dropped, the invitation should be resent by: Text to cell phone: [phone]  Will anyone else be joining your video visit? No        Video-Visit Details    Video Start Time: 3:31 PM    Type of service:  Video Visit    Video End Time:4:06 PM    Originating Location (pt. Location): Home    Distant Location (provider location):  [facility]     Platform used for Video Visit: [facility]   Virtual Headache Neurology Consult  September 29, 2022     Lexii Mendoza MRN# [id number]   YOB: 1980 Age: 42 year old     Requesting physician: self         Assessment and Recommendations:     Lexii Mendoza is a 42 year old female who presents for further evaluation of headache.    Her headache presentation meets criteria for episodic migraine without aura.  I suspect she has this on a genetic basis.  This is complicated by inflammatory arthritis, with chronic neck and right shoulder pain, possibly acting as a trigger.    Her virtual neurologic examination is intact.  I did not recommend further evaluation for secondary causes of headache today.  -I have referred her to physical medicine and rehabilitation for further evaluation of her musculoskeletal triggers, possibly trigger point injections.    For symptomatic management of headache, I recommend rizatriptan at an increased dose of 10 mg oral dissolvable tablet at the onset of headache, with a repeat dose in 2 hours if needed.  This should not exceed more than 9 days/month to avoid medication overuse.    If working to reduce musculoskeletal triggers and treating aggressively at onset are not adequately effective, preventative treatment could be considered in the future.  -Amitriptyline, propranolol, topiramate, or a CGRP inhibitor could be considered in the  future.    I will plan to see her back in 3 to 4 months to monitor progress.    Lexie Moore MD  Neurology            Chief Complaint:     Chief Complaint   Patient presents with     Video Visit     Headaches            History is obtained from the patient and medical record.  Patient was seen via a virtual visit in their home.      Lexii Mendoza is a 42 year old female who has been living with mild headaches for years, easily treated with ibuprofen 400 mg. This was less than monthly.    In January 2021, she had a severe headache while skiing. This lasted three days. OTC medications were not helpful. Ever since, she's had worse headaches, now lasting 3 days.     Milder headaches are posterior and last 1 day.    Headaches are right-sided. It starts in her neck as tightness, then her right side of her head gets painful, like she's been hit, an aching or soreness. It rates up to 7-10/10 and lasts 3 days.    She has associated mild photophobia, phonophobia. She denies nausea, vomiting.     She denies typical aura. She denies a positional component, but prefers to lay down. She denies unilateral autonomic features. She denies fevers or other illness associated.     She describes 10/30 headache days per month, with 6/30 severe headache days per month.     She has had to leave work due to headache rarely.    Rizatriptan 5 mg has been helpful, but less helpful lately.     Triggers include menstrual cycle, wine, stress, computer work, cologne.     Stress management and massage are helpful.             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    She drinks 1 cup of coffee daily.     Social History     Socioeconomic History     Marital status:      Spouse name: Not on file     Number of children: Not on file     Years of education: Not on file     Highest education level: Not on file   Occupational History     Not on file   Tobacco Use     Smoking status: Never Smoker     Smokeless tobacco: Never Used   Substance and Sexual Activity     Alcohol use: Yes     Alcohol/week: 2.0 standard drinks     Types: 2 Standard drinks or equivalent per week     Drug use: No     Sexual activity: Yes     Partners: Male     Birth control/protection: I.U.D.   Other Topics Concern     Not on file   Social History Narrative     Not on file     Social Determinants of Health     Financial Resource Strain: Not on file   Food Insecurity: Not on file   Transportation Needs: Not on file   Physical Activity: Not on file   Stress: Not on file   Social Connections: Not on file   Intimate Partner Violence: Not on file   Housing Stability: Not on file             Family History:   Mother and sister with migraine    Family History   Problem Relation Age of Onset     Family History Negative Other      Cancer No family hx of              Allergies:      Allergies   Allergen Reactions     Sulfasalazine Rash     Rash and elevated LFT             Medications:     Current Outpatient Medications:      adapalene (DIFFERIN) 0.1 % external cream, To face at bedtime, Disp: 45 g, Rfl: 11     albuterol (PROAIR HFA/PROVENTIL HFA/VENTOLIN HFA) 108 (90 Base) MCG/ACT inhaler, Inhale 2 puffs into the lungs every 4 hours as needed for shortness of breath / dyspnea or wheezing, Disp: 1 Inhaler, Rfl: 11     COSENTYX SENSOREADY  MG/ML Sensoready pen, Inject 150 mg Subcutaneous, Disp: , Rfl:      fluocinonide (LIDEX) 0.05 % external gel, Twice daily to arthropod bites as needed. 30g=1  month, Disp: 30 g, Rfl: 11     IBUPROFEN PO, Take 400 mg by mouth, Disp: , Rfl:      levonorgestrel (MIRENA) 20 MCG/24HR IUD, 1 each (20 mcg) by Intrauterine route once, Disp: , Rfl:      mometasone (ELOCON) 0.1 % external solution, Apply to scalp twice weekly as needed for itch. 60g=1 month, Disp: 60 mL, Rfl: 11     Multiple Vitamins-Minerals (MULTIVITAMIN ADULTS PO), , Disp: , Rfl:      rizatriptan (MAXALT-MLT) 5 MG ODT, Take 1 tablet (5 mg) by mouth at onset of headache for migraine May repeat in 2 hours. Max 6 tablets/24 hours., Disp: 15 tablet, Rfl: 4     permethrin (ELIMITE) 5 % external cream, Apply cream from head to toe (except the face); leave on for 8-14 hours then wash off with water; reapply in 1 week if live mites appear., Disp: 60 g, Rfl: 1          Physical Exam:   There were no vitals taken for this visit.   Physical Exam:   General: NAD  Neurologic:   Mental Status Exam: Alert, awake and oriented to situation. No dysarthria. Speech of normal fluency.   Cranial Nerves: Pupils equal, EOMs intact, no nystagmus, facial movements symmetric, hearing intact to conversation, tongue midline and fully mobile. No tongue atrophy or fasciculations.    Motor: No drift in upper extremities. Able to stand from a seated position without use of arms. No tremors or abnormal movements noted.   Coordination: With arms outstretched, able to touch nose using index finger accurately bilaterally.  Normal finger tapping bilaterally.     Gait: Normal stance and casual gait.    Neck: Normal range of motion with lateral head movements and neck flexion. (right-sided neck pain present)  Eyes: No conjunctival injection, no scleral icterus.           Data:     No previous head imaging.

## 2022-09-29 NOTE — LETTER
9/29/2022         RE: Lexii Mendoza  [address] [place] [address]        Dear Colleague,    Thank you for referring your patient, Lexii Mendoza, to the [facility] NEUROLOGY [facility]. Please see a copy of my visit note below.    Lexii is a 42 year old who is being evaluated via a billable video visit.      How would you like to obtain your AVS? MyChart  If the video visit is dropped, the invitation should be resent by: Text to cell phone: [phone]  Will anyone else be joining your video visit? No        Video-Visit Details    Video Start Time: 3:31 PM    Type of service:  Video Visit    Video End Time:4:06 PM    Originating Location (pt. Location): Home    Distant Location (provider location):  [facility] NEUROLOGY [facility]     Platform used for Video Visit: [employer]     [facility]   Virtual Headache Neurology Consult  September 29, 2022     Lexii Mendoza MRN# [id number]   YOB: 1980 Age: 42 year old     Requesting physician: self         Assessment and Recommendations:     Lexii Mendoza is a 42 year old female who presents for further evaluation of headache.    Her headache presentation meets criteria for episodic migraine without aura.  I suspect she has this on a genetic basis.  This is complicated by inflammatory arthritis, with chronic neck and right shoulder pain, possibly acting as a trigger.    Her virtual neurologic examination is intact.  I did not recommend further evaluation for secondary causes of headache today.  -I have referred her to physical medicine and rehabilitation for further evaluation of her musculoskeletal triggers, possibly trigger point injections.    For symptomatic management of headache, I recommend rizatriptan at an increased dose of 10 mg oral dissolvable tablet at the onset of headache, with a repeat dose in 2 hours if needed.  This should not exceed more than 9 days/month to avoid medication overuse.    If  working to reduce musculoskeletal triggers and treating aggressively at onset are not adequately effective, preventative treatment could be considered in the future.  -Amitriptyline, propranolol, topiramate, or a CGRP inhibitor could be considered in the future.    I will plan to see her back in 3 to 4 months to monitor progress.    Lexie Moore MD  Neurology            Chief Complaint:     Chief Complaint   Patient presents with     Video Visit     Headaches            History is obtained from the patient and medical record.  Patient was seen via a virtual visit in their home.      Lexii Mendoza is a 42 year old female who has been living with mild headaches for years, easily treated with ibuprofen 400 mg. This was less than monthly.    In January 2021, she had a severe headache while skiing. This lasted three days. OTC medications were not helpful. Ever since, she's had worse headaches, now lasting 3 days.     Milder headaches are posterior and last 1 day.    Headaches are right-sided. It starts in her neck as tightness, then her right side of her head gets painful, like she's been hit, an aching or soreness. It rates up to 7-10/10 and lasts 3 days.    She has associated mild photophobia, phonophobia. She denies nausea, vomiting.     She denies typical aura. She denies a positional component, but prefers to lay down. She denies unilateral autonomic features. She denies fevers or other illness associated.     She describes 10/30 headache days per month, with 6/30 severe headache days per month.     She has had to leave work due to headache rarely.    Rizatriptan 5 mg has been helpful, but less helpful lately.     Triggers include menstrual cycle, wine, stress, computer work, cologne.     Stress management and massage are helpful.             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    She drinks 1 cup of coffee daily.     Social History     Socioeconomic History     Marital status:      Spouse name: Not on file     Number of children: Not on file     Years of education: Not on file     Highest education level: Not on file   Occupational History     Not on file   Tobacco Use     Smoking status: Never Smoker     Smokeless tobacco: Never Used   Substance and Sexual Activity     Alcohol use: Yes     Alcohol/week: 2.0 standard drinks     Types: 2 Standard drinks or equivalent per week     Drug use: No     Sexual activity: Yes     Partners: Male     Birth control/protection: I.U.D.   Other Topics Concern     Not on file   Social History Narrative     Not on file     Social Determinants of Health     Financial Resource Strain: Not on file   Food Insecurity: Not on file   Transportation Needs: Not on file   Physical Activity: Not on file   Stress: Not on file   Social Connections: Not on file   Intimate Partner Violence: Not on file   Housing Stability: Not on file             Family History:   Mother and sister with migraine    Family History   Problem Relation Age of Onset     Family History Negative Other      Cancer No family hx of              Allergies:      Allergies   Allergen Reactions     Sulfasalazine Rash     Rash and elevated LFT             Medications:     Current Outpatient Medications:      adapalene (DIFFERIN) 0.1 % external cream, To face at bedtime, Disp: 45 g, Rfl: 11     albuterol (PROAIR HFA/PROVENTIL HFA/VENTOLIN HFA) 108 (90 Base) MCG/ACT inhaler, Inhale 2 puffs into the lungs every 4 hours as needed for  shortness of breath / dyspnea or wheezing, Disp: 1 Inhaler, Rfl: 11     COSENTYX SENSOREADY  MG/ML Sensoready pen, Inject 150 mg Subcutaneous, Disp: , Rfl:      fluocinonide (LIDEX) 0.05 % external gel, Twice daily to arthropod bites as needed. 30g=1 month, Disp: 30 g, Rfl: 11     IBUPROFEN PO, Take 400 mg by mouth, Disp: , Rfl:      levonorgestrel (MIRENA) 20 MCG/24HR IUD, 1 each (20 mcg) by Intrauterine route once, Disp: , Rfl:      mometasone (ELOCON) 0.1 % external solution, Apply to scalp twice weekly as needed for itch. 60g=1 month, Disp: 60 mL, Rfl: 11     Multiple Vitamins-Minerals (MULTIVITAMIN ADULTS PO), , Disp: , Rfl:      rizatriptan (MAXALT-MLT) 5 MG ODT, Take 1 tablet (5 mg) by mouth at onset of headache for migraine May repeat in 2 hours. Max 6 tablets/24 hours., Disp: 15 tablet, Rfl: 4     permethrin (ELIMITE) 5 % external cream, Apply cream from head to toe (except the face); leave on for 8-14 hours then wash off with water; reapply in 1 week if live mites appear., Disp: 60 g, Rfl: 1          Physical Exam:   There were no vitals taken for this visit.   Physical Exam:   General: NAD  Neurologic:   Mental Status Exam: Alert, awake and oriented to situation. No dysarthria. Speech of normal fluency.   Cranial Nerves: Pupils equal, EOMs intact, no nystagmus, facial movements symmetric, hearing intact to conversation, tongue midline and fully mobile. No tongue atrophy or fasciculations.    Motor: No drift in upper extremities. Able to stand from a seated position without use of arms. No tremors or abnormal movements noted.   Coordination: With arms outstretched, able to touch nose using index finger accurately bilaterally.  Normal finger tapping bilaterally.     Gait: Normal stance and casual gait.    Neck: Normal range of motion with lateral head movements and neck flexion. (right-sided neck pain present)  Eyes: No conjunctival injection, no scleral icterus.           Data:     No previous head  imaging.          Again, thank you for allowing me to participate in the care of your patient.        Sincerely,        Lexie Moore MD

## 2022-09-30 ENCOUNTER — TELEPHONE (OUTPATIENT)
Dept: PHYSICAL MEDICINE AND REHAB | Facility: CLINIC | Age: 42
End: 2022-09-30

## 2022-09-30 NOTE — TELEPHONE ENCOUNTER
Health Call Center    Phone Message    May a detailed message be left on voicemail: yes     Reason for Call: Appointment Intake    Referring Provider Name: Lexie Briggs  Diagnosis and/or Symptoms: Migraine without aura and with status migrainosus, not intractable  Right shoulder pain, unspecified chronicity    Per Dr. Moore please assist with scheduling for 42F with episodic migraine, musculoskeletal triggers in right neck and shoulder, consideration for TPIs. Please call patient at [phone] to assist with scheduling.    Action Taken: Message routed to:  [facility]    Travel Screening: Not Applicable

## 2022-10-28 ENCOUNTER — OFFICE VISIT (OUTPATIENT)
Dept: INTERNAL MEDICINE | Facility: CLINIC | Age: 42
End: 2022-10-28
Payer: COMMERCIAL

## 2022-10-28 VITALS
OXYGEN SATURATION: 99 % | HEIGHT: 66 IN | SYSTOLIC BLOOD PRESSURE: 111 MMHG | DIASTOLIC BLOOD PRESSURE: 72 MMHG | HEART RATE: 72 BPM | BODY MASS INDEX: 19.75 KG/M2

## 2022-10-28 DIAGNOSIS — Z13.220 SCREENING CHOLESTEROL LEVEL: Primary | ICD-10-CM

## 2022-10-28 PROCEDURE — 99396 PREV VISIT EST AGE 40-64: CPT | Performed by: INTERNAL MEDICINE

## 2022-10-28 ASSESSMENT — ENCOUNTER SYMPTOMS
WEAKNESS: 0
EYE REDNESS: 1
PARALYSIS: 0
NUMBNESS: 0
MEMORY LOSS: 0
TINGLING: 0
EYE IRRITATION: 0
SPEECH CHANGE: 0
DOUBLE VISION: 0
TREMORS: 0
HEADACHES: 1
SEIZURES: 0
LOSS OF CONSCIOUSNESS: 0
EYE WATERING: 0
EYE PAIN: 1
DISTURBANCES IN COORDINATION: 0
DIZZINESS: 0

## 2022-10-28 NOTE — NURSING NOTE
Lexii Mendoza is a 42 year old female that presents in clinic today for the following:     Chief Complaint   Patient presents with     Physical     Annual physical per pt        The patient's allergies and medications were reviewed. The patient's vitals were obtained without incident. The patient does not have any other questions for the provider.     Eboni Morgan, EMT at 8:56 AM on 10/28/2022.  Primary Care Clinic: [phone]

## 2022-10-28 NOTE — PROGRESS NOTES
"Lexii is a very pleasant  42 year old female who is here for her annual physical.    ROS is notable for some eye symptoms; will see Optho later today.  Otherwise, feeling well, would like more time for regular exercise, but reports being quite busy especially over the summer. 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  During that time her migraines increased in frequency and severity but she found that increasing the maxalt dose to 10 mg was more effective than 5 mg.     No changes to past, family or social history and  ROS: 10 point ROS neg other than the symptoms noted above in the HPI.    HCM: due for pap next year and colonoscopy the year following that.    PHYSICAL EXAM:  /72 (BP Location: Right arm, Patient Position: Sitting, Cuff Size: Adult Regular)   Pulse 72   Ht 1.666 m (5' 5.58\")   SpO2 99%   BMI 19.75 kg/m    Constitutional: no distress, comfortable, pleasant   Eyes: anicteric, normal extra-ocular movements   Ears, Nose and Throat: tympanic membranes clear, nose clear and free of lesions, throat clear, neck supple with full range of motion, no thyromegaly.   Cardiovascular: regular rate and rhythm, normal S1 and S2, no murmurs, rubs or gallops, peripheral pulses full and symmetric   Respiratory: clear to auscultation, no wheezes or crackles, normal breath sounds   Gastrointestinal: positive bowel sounds, nontender, no hepatosplenomegaly, no masses   Musculoskeletal: knees full range of motion, no edema   Skin: no visible rash, no jaundice   Neurological: normal gait, no tremor   Psychological: appropriate mood   Lymphatic: no cervical lymphadenopathy and no pedal edema    A/P Lexii was seen today for physical.    Diagnoses and all orders for this visit:    Screening cholesterol level  -     Lipid Profile FASTING; Future  -     TSH with free T4 reflex; Future  -     Comprehensive metabolic panel; Future      RTC one " year or sooner pascalen    Victorina Garcia MD

## 2023-01-12 ENCOUNTER — TRANSFERRED RECORDS (OUTPATIENT)
Dept: HEALTH INFORMATION MANAGEMENT | Facility: CLINIC | Age: 43
End: 2023-01-12

## 2023-02-28 ENCOUNTER — TELEPHONE (OUTPATIENT)
Dept: NEUROLOGY | Facility: CLINIC | Age: 43
End: 2023-02-28
Payer: COMMERCIAL

## 2023-02-28 NOTE — TELEPHONE ENCOUNTER
Called pt to schedule soonest appt with Rufina Husain. Patient did not answer, left msg to call back and get scheduled.     Maria C Mullins MA

## 2023-03-13 ENCOUNTER — TELEPHONE (OUTPATIENT)
Dept: NEUROLOGY | Facility: CLINIC | Age: 43
End: 2023-03-13
Payer: COMMERCIAL

## 2023-03-13 NOTE — TELEPHONE ENCOUNTER
Returned pt call for sooner appointment. Spoke with patient, is available to come in this Friday. Rescheduled appointment for Friday at 2:00PM with Rufina Husain.    Maria C Mullins MA

## 2023-03-17 ENCOUNTER — OFFICE VISIT (OUTPATIENT)
Dept: NEUROLOGY | Facility: CLINIC | Age: 43
End: 2023-03-17
Payer: COMMERCIAL

## 2023-03-17 VITALS — DIASTOLIC BLOOD PRESSURE: 85 MMHG | OXYGEN SATURATION: 100 % | HEART RATE: 75 BPM | SYSTOLIC BLOOD PRESSURE: 125 MMHG

## 2023-03-17 DIAGNOSIS — G43.001 MIGRAINE WITHOUT AURA AND WITH STATUS MIGRAINOSUS, NOT INTRACTABLE: ICD-10-CM

## 2023-03-17 DIAGNOSIS — G43.001 MIGRAINE WITHOUT AURA AND WITH STATUS MIGRAINOSUS, NOT INTRACTABLE: Primary | ICD-10-CM

## 2023-03-17 PROCEDURE — 99204 OFFICE O/P NEW MOD 45 MIN: CPT

## 2023-03-17 RX ORDER — TOPIRAMATE 25 MG/1
100 TABLET, FILM COATED ORAL AT BEDTIME
Qty: 120 TABLET | Refills: 3 | Status: SHIPPED | OUTPATIENT
Start: 2023-03-17 | End: 2023-03-20

## 2023-03-17 ASSESSMENT — HEADACHE IMPACT TEST (HIT 6)
HOW OFTEN DO HEADACHES LIMIT YOUR DAILY ACTIVITIES: VERY OFTEN
HOW OFTEN DID HEADACHS LIMIT CONCENTRATION ON WORK OR DAILY ACTIVITY: SOMETIMES
HOW OFTEN HAVE YOU FELT FED UP OR IRRITATED BECAUSE OF YOUR HEADACHES: VERY OFTEN
WHEN YOU HAVE A HEADACHE HOW OFTEN DO YOU WISH YOU COULD LIE DOWN: VERY OFTEN
HIT6 TOTAL SCORE: 64
WHEN YOU HAVE HEADACHES HOW OFTEN IS THE PAIN SEVERE: VERY OFTEN
HOW OFTEN HAVE YOU FELT TOO TIRED TO WORK BECAUSE OF YOUR HEADACHES: SOMETIMES

## 2023-03-17 ASSESSMENT — PAIN SCALES - GENERAL: PAINLEVEL: MILD PAIN (3)

## 2023-03-17 NOTE — PROGRESS NOTES
[facility]    Headache Neurology Progress Note    March 17, 2023    Subjective:    Lexii presents for sooner follow up of migraine.     Her last visit was with Dr. Moore on 9/29/2022 and at that time she reported having 10/30 headache days per month, with 6/30 severe headache days per month. Rizatriptan was increased to 10 mg tablet and initially she found this very effective.    Over the past 6 weeks, she has noted that her headaches have worsened.  She is having headaches more than half of the days of the week.  Lexii currently reports 25/30 headache days per month, with 12/30 severe headache days per month. Additionally, she has been waking up with a headache and can also be woken up by her headaches.  She has been using her rizatriptan and needing to take the second dose frequently.  She will also need to take additional ibuprofen in order to manage her headaches acutely.  Despite this, her headaches are lasting longer in duration.  Now they can last beyond 3 days, up to 2 weeks.    She can have headaches which are right-sided aching which can be behind her eye, throughout the right side of her head. She can have other headaches which are located posteriorly across the occiput. These are both occurring more frequently.     She denies any vision changes, new paresthesias or weakness, dizziness. She can have improvement of her morning headaches upon sitting up, but other times moving around can make headache worse.    No vision changes, new paresthesias or weakness.     She did see ortho for her shoulder pain, was told she has bursitis. She got a steroid shot and this feels better.       Objective:    Vitals: /85   Pulse 75   SpO2 100%   General: In no acute distress.  Head: Normocephalic, atraumatic. No radiating pain with palpation over the supraorbital notches, occipital nerves. Temporal pulses intact.   Neck: Normal range of motion with lateral head movements and neck  flexion.  Eyes: No conjunctival injection, no scleral icterus.     Neurologic Exam:  Mental Status Exam: Alert, awake and oriented to situation. No dysarthria. Speech of normal fluency.  Cranial Nerves: Fundoscopic exam with clear disc margins bilaterally. PERRLA, EOMs intact, no nystagmus, facial movements symmetric, facial sensation intact to light touch, hearing intact to conversation, trapezius and SCMs 5/5 bilaterally, tongue midline and fully mobile. No tongue atrophy or fasciculations.   Motor: Normal tone in all four extremities, no atrophy or fasciculations. No tremors or abnormal movements noted.  Coordination: Rapidly alternating movements intact bilaterally in the upper extremities. Normal finger tapping bilaterally. Normal Romberg.  Reflexes: 2+ and symmetric in triceps, biceps, brachioradialis, patellar, and Achilles.  Gait: Normal gait. Able to toe and heel walk. Normal tandem gait.      Assessment and Plan:   Lexii is a 42 year old female who presents for sooner follow-up for migraine without aura.    Since her last visit, she has had a significant increase in the frequency of her headaches.  She is also having headaches which are waking her from sleep.  This is new for her.  Additionally, her previous acute headache treatments are not as effective.    Her neurologic examination is intact today which is reassuring.  Given her increase in headache frequency and headache waking her from sleep, we will obtain an MRI and MRV brain to assess for possible structural or vascular causes of headache.    We discussed the following symptomatic treatment strategy:  - For acute treatment of mild headache, she may use ibuprofen as needed, not to exceed 14 days per month to avoid medication overuse.   - For acute treatment of moderate to severe headache, she may use rizatriptan 10 mg tablet taken at onset of headache with a repeat dose after 2 hours if needed. Use should not exceed 9 days per month to avoid  medication overuse.    Her current frequency and severity of headaches warrant prevention.  - We discussed propanolol, amitriptyline, and topiramate as options today.   - I recommend a trial of topiramate starting with 25 mg nightly and increasing by 25 mg weekly, as needed and as tolerated, up to 100 mg nightly. Side effects reviewed. I recommend a trial of at least 4-6 weeks prior to determining effectiveness.   - If topiramate is not tolerated or not effective after adequate trial, alternatives include propranolol, amitriptyline, or CGRP inhibitors.     I will follow-up with her in 3 months to monitor her progress.     Rufina Husain PA-C  Headache Neurology  [facility] Neurology [facility]

## 2023-03-17 NOTE — LETTER
3/17/2023         RE: Lexii Mendoza  [address] [place] [address]        Dear Colleague,    Thank you for referring your patient, Lexii Mendoza, to the [facility] NEUROLOGY CLINICS [facility]. Please see a copy of my visit note below.    [facility]    Headache Neurology Progress Note    March 17, 2023    Subjective:    Lexii presents for sooner follow up of migraine.     Her last visit was with Dr. Moore on 9/29/2022 and at that time she reported having 10/30 headache days per month, with 6/30 severe headache days per month. Rizatriptan was increased to 10 mg tablet and initially she found this very effective.    Over the past 6 weeks, she has noted that her headaches have worsened.  She is having headaches more than half of the days of the week.  Lexii currently reports 25/30 headache days per month, with 12/30 severe headache days per month. Additionally, she has been waking up with a headache and can also be woken up by her headaches.  She has been using her rizatriptan and needing to take the second dose frequently.  She will also need to take additional ibuprofen in order to manage her headaches acutely.  Despite this, her headaches are lasting longer in duration.  Now they can last beyond 3 days, up to 2 weeks.    She can have headaches which are right-sided aching which can be behind her eye, throughout the right side of her head. She can have other headaches which are located posteriorly across the occiput. These are both occurring more frequently.     She denies any vision changes, new paresthesias or weakness, dizziness. She can have improvement of her morning headaches upon sitting up, but other times moving around can make headache worse.    No vision changes, new paresthesias or weakness.     She did see ortho for her shoulder pain, was told she has bursitis. She got a steroid shot and this feels better.       Objective:    Vitals: /85    Pulse 75   SpO2 100%   General: In no acute distress.  Head: Normocephalic, atraumatic. No radiating pain with palpation over the supraorbital notches, occipital nerves. Temporal pulses intact.   Neck: Normal range of motion with lateral head movements and neck flexion.  Eyes: No conjunctival injection, no scleral icterus.     Neurologic Exam:  Mental Status Exam: Alert, awake and oriented to situation. No dysarthria. Speech of normal fluency.  Cranial Nerves: Fundoscopic exam with clear disc margins bilaterally. PERRLA, EOMs intact, no nystagmus, facial movements symmetric, facial sensation intact to light touch, hearing intact to conversation, trapezius and SCMs 5/5 bilaterally, tongue midline and fully mobile. No tongue atrophy or fasciculations.   Motor: Normal tone in all four extremities, no atrophy or fasciculations. No tremors or abnormal movements noted.  Coordination: Rapidly alternating movements intact bilaterally in the upper extremities. Normal finger tapping bilaterally. Normal Romberg.  Reflexes: 2+ and symmetric in triceps, biceps, brachioradialis, patellar, and Achilles.  Gait: Normal gait. Able to toe and heel walk. Normal tandem gait.      Assessment and Plan:   Lexii is a 42 year old female who presents for sooner follow-up for migraine without aura.    Since her last visit, she has had a significant increase in the frequency of her headaches.  She is also having headaches which are waking her from sleep.  This is new for her.  Additionally, her previous acute headache treatments are not as effective.    Her neurologic examination is intact today which is reassuring.  Given her increase in headache frequency and headache waking her from sleep, we will obtain an MRI and MRV brain to assess for possible structural or vascular causes of headache.    We discussed the following symptomatic treatment strategy:  - For acute treatment of mild headache, she may use ibuprofen as needed, not to exceed 14  days per month to avoid medication overuse.   - For acute treatment of moderate to severe headache, she may use rizatriptan 10 mg tablet taken at onset of headache with a repeat dose after 2 hours if needed. Use should not exceed 9 days per month to avoid medication overuse.    Her current frequency and severity of headaches warrant prevention.  - We discussed propanolol, amitriptyline, and topiramate as options today.   - I recommend a trial of topiramate starting with 25 mg nightly and increasing by 25 mg weekly, as needed and as tolerated, up to 100 mg nightly. Side effects reviewed. I recommend a trial of at least 4-6 weeks prior to determining effectiveness.   - If topiramate is not tolerated or not effective after adequate trial, alternatives include propranolol, amitriptyline, or CGRP inhibitors.     I will follow-up with her in 3 months to monitor her progress.     Jatin Husain PA-C  Headache Neurology  [facility]          Again, thank you for allowing me to participate in the care of your patient.        Sincerely,        JATIN HUSAIN PA-C

## 2023-03-17 NOTE — PATIENT INSTRUCTIONS
Imaging [phone] ([place])  [phone] ([place])    Topiramate:  Week 1: Take 25 mg (1 tablet) at night  Week 2: Take 50 mg (2 tablets) at night  Week 3: Take 75 mg (3 tablets) at night  Week 4: Take 100 mg (4 tablets) at night and stay at this dose

## 2023-03-17 NOTE — NURSING NOTE
"Lexii Mendoza is a 42 year old female who presents for:  Chief Complaint   Patient presents with     Headache     Migraine without aura and with status migrainosus, not intractable   Right shoulder pain, unspecified chronicity        Initial Vitals:  /85   Pulse 75   SpO2 100%  Estimated body mass index is 19.75 kg/m  as calculated from the following:    Height as of 10/28/22: 1.666 m (5' 5.58\").    Weight as of 11/19/21: 54.8 kg (120 lb 12.8 oz).. There is no height or weight on file to calculate BSA. BP completed using cuff size: regular  Mild Pain (3)    Nursing Comments:     Maria C Mullins MA  "

## 2023-03-20 RX ORDER — TOPIRAMATE 25 MG/1
TABLET, FILM COATED ORAL
Qty: 360 TABLET | Refills: 0 | Status: SHIPPED | OUTPATIENT
Start: 2023-03-20 | End: 2023-05-10 | Stop reason: SINTOL

## 2023-03-20 NOTE — TELEPHONE ENCOUNTER
Requesting 90 day supply.  Venecia CAMPOVERDE, RN, BSN  [facility]

## 2023-03-22 ENCOUNTER — TRANSFERRED RECORDS (OUTPATIENT)
Dept: HEALTH INFORMATION MANAGEMENT | Facility: CLINIC | Age: 43
End: 2023-03-22
Payer: COMMERCIAL

## 2023-03-23 ENCOUNTER — TRANSFERRED RECORDS (OUTPATIENT)
Dept: HEALTH INFORMATION MANAGEMENT | Facility: CLINIC | Age: 43
End: 2023-03-23
Payer: COMMERCIAL

## 2023-03-28 ENCOUNTER — HOSPITAL ENCOUNTER (OUTPATIENT)
Dept: MRI IMAGING | Facility: CLINIC | Age: 43
Discharge: HOME | End: 2023-03-28
Payer: COMMERCIAL

## 2023-03-28 DIAGNOSIS — G43.001 MIGRAINE WITHOUT AURA AND WITH STATUS MIGRAINOSUS, NOT INTRACTABLE: ICD-10-CM

## 2023-03-28 PROCEDURE — 255N000002 HC RX 255 OP 636

## 2023-03-28 PROCEDURE — 70553 MRI BRAIN STEM W/O & W/DYE: CPT

## 2023-03-28 PROCEDURE — A9585 GADOBUTROL INJECTION: HCPCS

## 2023-03-28 PROCEDURE — 70546 MR ANGIOGRAPH HEAD W/O&W/DYE: CPT

## 2023-03-28 RX ORDER — GADOBUTROL 604.72 MG/ML
10 INJECTION INTRAVENOUS ONCE
Status: COMPLETED | OUTPATIENT
Start: 2023-03-28 | End: 2023-03-28

## 2023-03-28 RX ADMIN — GADOBUTROL 10 ML: 604.72 INJECTION INTRAVENOUS at 19:54

## 2023-05-06 ENCOUNTER — MYC MEDICAL ADVICE (OUTPATIENT)
Dept: NEUROLOGY | Facility: CLINIC | Age: 43
End: 2023-05-06
Payer: COMMERCIAL

## 2023-05-06 DIAGNOSIS — G43.001 MIGRAINE WITHOUT AURA AND WITH STATUS MIGRAINOSUS, NOT INTRACTABLE: Primary | ICD-10-CM

## 2023-05-08 NOTE — TELEPHONE ENCOUNTER
Please see myChart message and advise on ordering a decreased dose of topiramate.     Venecia CAMPOVERDE RN, BSN  [facility]

## 2023-05-10 DIAGNOSIS — G43.001 MIGRAINE WITHOUT AURA AND WITH STATUS MIGRAINOSUS, NOT INTRACTABLE: ICD-10-CM

## 2023-05-10 RX ORDER — VERAPAMIL HYDROCHLORIDE 120 MG/1
120 TABLET, FILM COATED, EXTENDED RELEASE ORAL AT BEDTIME
Qty: 30 TABLET | Refills: 3 | Status: SHIPPED | OUTPATIENT
Start: 2023-05-10 | End: 2023-05-11

## 2023-05-11 RX ORDER — VERAPAMIL HYDROCHLORIDE 120 MG/1
TABLET, FILM COATED, EXTENDED RELEASE ORAL
Qty: 90 TABLET | Refills: 0 | Status: SHIPPED | OUTPATIENT
Start: 2023-05-11 | End: 2023-09-21

## 2023-05-11 NOTE — TELEPHONE ENCOUNTER
Pt requesting 90-day.   Venecia CAMPOVERDE, RN, BSN  [facility]

## 2023-06-19 ENCOUNTER — OFFICE VISIT (OUTPATIENT)
Dept: DERMATOLOGY | Facility: CLINIC | Age: 43
End: 2023-06-19
Attending: DERMATOLOGY
Payer: COMMERCIAL

## 2023-06-19 ENCOUNTER — VIRTUAL VISIT (OUTPATIENT)
Dept: NEUROLOGY | Facility: CLINIC | Age: 43
End: 2023-06-19
Payer: COMMERCIAL

## 2023-06-19 VITALS — SYSTOLIC BLOOD PRESSURE: 111 MMHG | DIASTOLIC BLOOD PRESSURE: 75 MMHG | HEART RATE: 60 BPM

## 2023-06-19 DIAGNOSIS — G43.709 CHRONIC MIGRAINE WITHOUT AURA WITHOUT STATUS MIGRAINOSUS, NOT INTRACTABLE: Primary | ICD-10-CM

## 2023-06-19 DIAGNOSIS — L56.5 DSAP (DISSEMINATED SUPERFICIAL ACTINIC POROKERATOSIS): Primary | ICD-10-CM

## 2023-06-19 PROCEDURE — 99214 OFFICE O/P EST MOD 30 MIN: CPT | Mod: VID

## 2023-06-19 PROCEDURE — G0463 HOSPITAL OUTPT CLINIC VISIT: HCPCS | Performed by: DERMATOLOGY

## 2023-06-19 PROCEDURE — 99213 OFFICE O/P EST LOW 20 MIN: CPT | Performed by: DERMATOLOGY

## 2023-06-19 ASSESSMENT — MIGRAINE DISABILITY ASSESSMENT (MIDAS)
ON A SCALE FROM 0-10 ON AVERAGE HOW PAINFUL WERE HEADACHES: 5
HOW MANY DAYS WAS YOUR PRODUCTIVITY CUT IN HALF BECAUSE OF HEADACHES: 2
HOW MANY DAYS WAS HOUSEWORK PRODUCTIVITY CUT IN HALF DUE TO HEADACHES: 2
TOTAL SCORE: 7
HOW MANY DAYS DID YOU NOT DO HOUSEWORK BECAUSE OF HEADACHES: 1
HOW OFTEN WERE SOCIAL ACTIVITIES MISSED DUE TO HEADACHES: 2
HOW MANY DAYS IN THE PAST 3 MONTHS HAVE YOU HAD A HEADACHE: 30
HOW MANY DAYS DID YOU MISS WORK OR SCHOOL BECAUSE OF HEADACHES: 0

## 2023-06-19 ASSESSMENT — HEADACHE IMPACT TEST (HIT 6)
HOW OFTEN DO HEADACHES LIMIT YOUR DAILY ACTIVITIES: SOMETIMES
WHEN YOU HAVE HEADACHES HOW OFTEN IS THE PAIN SEVERE: VERY OFTEN
HOW OFTEN DID HEADACHS LIMIT CONCENTRATION ON WORK OR DAILY ACTIVITY: SOMETIMES
HOW OFTEN HAVE YOU FELT TOO TIRED TO WORK BECAUSE OF YOUR HEADACHES: RARELY
WHEN YOU HAVE A HEADACHE HOW OFTEN DO YOU WISH YOU COULD LIE DOWN: VERY OFTEN
HOW OFTEN HAVE YOU FELT FED UP OR IRRITATED BECAUSE OF YOUR HEADACHES: SOMETIMES
HIT6 TOTAL SCORE: 60

## 2023-06-19 NOTE — PROGRESS NOTES
[facility]    Headache Neurology Progress Note    June 19, 2023    Subjective:    Lexii presents for follow up of migraine without aura.     At last visit 3/15/23, she had been experiencing more frequent migraines (25/30 headache days per month with 12/30 severe headache days per month). MRI and MRV brain were both reassuring. Topiramate was trialed for headache prophylaxis but not tolerated due to cognitive fog. She has since started verapamil, as this may have benefit for both headaches and her Raynaud's.     At baseline, headaches are right-sided aching or throbbing pain. At times she can have tightness across the occiput, at the back of her neck. She has associated photophobia, phonophobia.     She reports a slight improvement in her headache frequency since last visit and is so far tolerating verapamil well.     Lexii currently reports 15+/30 headache days per month, with 8+/30 severe headache days per month.     She has been monitoring her heart rate on her smart watch and notes that at times, it can be in the mid-40s. At baseline, her pulse and blood pressure are low.     She continues to experience migraine attacks in 3-4 day clusters about twice per month. She is having a few other headaches lasting less than one day occur throughout the week between her more prolonged migraine attacks.       Current headache treatments:  Acute therapies:  - Ibuprofen  - Rizatriptan 10 mg    Preventative therapies:  - Verapamil - mildly effective    Supportive therapies:    Previous treatments tried:  Acute therapies:    Preventative therapies:  - Topiramate - somewhat effective for headaches but not tolerated due to cognitive fog    Supportive therapies:      Objective:    Vitals: There were no vitals taken for this visit.  General: Cooperative, NAD  Neurologic Exam:  Mental Status: Fully alert, attentive and oriented. Speech is clear and fluent.   Cranial Nerves: Facial movements symmetric.    Motor: No abnormal movements.      Pertinent Investigations:    MRI/MRV Brain (3/28/23)  HEAD MRI:  1.  No acute/subacute infarction, intracranial hemorrhage, mass effect, hydrocephalus, or pathologic enhancement.  2.  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     HEAD MRV:  1.  No dural venous sinus thrombosis or significant stenosis.    Assessment and Plan:   Lexii is a 43 year old female who presents for follow-up of migraine headache.     She as continued to experience more frequent migraines, now meeting criteria for chronic migraine without aura.     We discussed the following treatment strategy:  - For acute treatment of mild headache, she may use ibuprofen as needed, not to exceed 14 days per month to avoid medication overuse.   - For acute treatment of moderate to severe headache, she may use rizatriptan 10 mg taken at onset of headache with a repeat dose taken after 2 hours if needd. Use should not exceed 9 days per month to avoid medication overuse.    Her current frequency and severity of headaches warrant prevention.  - Topiramate trial was failed due to side effects. Verapamil has been somewhat helpful, but she continues to experience frequent and severe headaches.   - Discussed increasing verapamil, but given her low heart rates at current dose, I do not recommend increasing dose further.  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   - I recommend a trial of botulinum toxin injection following a chronic migraine protocol administered every 12 weeks. We will work on prior authorization of this. I recommend a trial of 3 rounds prior to determining effectiveness.   - If Botox is not tolerated or not effective, could consider CGRP inhibitors.   - In the meantime, she may also consider supplementation of magnesium and/or riboflavin for headache prevention. I recommend 400 mg of each  daily.    I will plan to see her back in a few weeks for first round of injections.     Rufina Husain PA-C  Headache Neurology  [facility] Neurology [facility]

## 2023-06-19 NOTE — NURSING NOTE
Is the patient currently in the state of [place]? YES    Visit mode:VIDEO    If the visit is dropped, the patient can be reconnected by: VIDEO VISIT: Text to cell phone: [phone]    Will anyone else be joining the visit? NO      How would you like to obtain your AVS? MyChart    Are changes needed to the allergy or medication list? NO    Reason for visit: RECHECK

## 2023-06-19 NOTE — Clinical Note
Ordered Botox for patient, will need appt scheduled in 3 weeks or beyond.  Not sure if virtual rooming staff will schedule this but sending to you just in case!

## 2023-06-19 NOTE — PATIENT INSTRUCTIONS
[facility]- Pediatric Dermatology  Dr. Lexii Mendoza, Dr. Pete Garcia, Dr. Syeda Rowe, Dr. Angie Zheng, TOY [address] Dr. Desiree Simms    Non Urgent  Nurse Triage Line; [phone]- Radha and Ynes AHUJA Care Coordinators    Saritha (/Complex ) [phone]    If you need a prescription refill, please contact your pharmacy. Refills are approved or denied by our Physicians during normal business hours, Monday through Fridays  Per office policy, refills will not be granted if you have not been seen within the past year (or sooner depending on your child's condition)      Scheduling Information:   Pediatric Appointment Scheduling and Call Center [phone]   Radiology Scheduling- [phone]   Sedation Unit Scheduling- [phone]  Main  Services: [phone]   Greek: [phone]   Slovenian: [phone]   Hmong/Tajik/Butch: [phone]    Preadmission Nursing Department Fax Number: [phone] (Fax all pre-operative paperwork to this number)      For urgent matters arising during evenings, weekends, or holidays that cannot wait for normal business hours please call [phone] and ask for the Dermatology Resident On-Call to be paged.

## 2023-06-19 NOTE — PROGRESS NOTES
Virtual Visit Details    Type of service:  Video Visit     Originating Location (pt. Location): Home    Distant Location (provider location):  Off-site  Platform used for Video Visit: Kenia

## 2023-06-19 NOTE — NURSING NOTE
"[facility] [[id number]]  Chief Complaint   Patient presents with     RECHECK     Rash on Arms.     Initial /75   Pulse 60  Estimated body mass index is 19.75 kg/m  as calculated from the following:    Height as of 10/28/22: 5' 5.58\" (166.6 cm).    Weight as of 11/19/21: 120 lb 12.8 oz (54.8 kg).  Medication Reconciliation: complete    Does the patient need any medication refills today? No    Does the patient/parent need MyChart or Proxy acces today? No     Sarah Brown CMA            "

## 2023-06-19 NOTE — PATIENT INSTRUCTIONS
Other options for headache prevention:  - Magnesium and/or riboflavin (vitamin B2) 400 mg of each daily     We will work on Botox approval

## 2023-06-19 NOTE — LETTER
6/19/2023      RE: Lexii Mendoza  [address] [place] [address]     Dear Colleague,    Thank you for the opportunity to participate in the care of your patient, Lexii Mendoza, at the [facility] PEDIATRIC SPECIALTY CLINIC at [facility]. Please see a copy of my visit note below.        DPL:  1. Acne vulgaris, lower facial. Topical adapalene. Past spironolactone.   2. Seborrheic dermatitis with predominant pruritus.   3. DSAP- past history of PDT      ASSESSMENT AND PLAN:   1.  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  Past treatment approximately 10 years ago with photodynamic therapy.  Today I prescribed lovastatin cholesterol 2%/2% to skin medicinals.     The patient to return to clinic in 1 year's time, sooner if concern.    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 He has tried PDT without clearance. Lesions are more prominent in the summer. Interested in topical options.     Patient Active Problem List   Diagnosis    NO ACTIVE PROBLEMS    Encounter for removal and reinsertion of intrauterine contraceptive device       Allergies   Allergen Reactions    Sulfasalazine Rash     Rash and elevated LFT         Current Outpatient Medications   Medication    adapalene (DIFFERIN) 0.1 % external cream    albuterol (PROAIR HFA/PROVENTIL HFA/VENTOLIN HFA) 108 (90 Base) MCG/ACT inhaler    COSENTYX SENSOREADY  MG/ML Sensoready pen    fluocinonide (LIDEX) 0.05 % external gel    IBUPROFEN PO    levonorgestrel (MIRENA) 20 MCG/24HR IUD    mometasone (ELOCON) 0.1 % external solution    Multiple Vitamins-Minerals (MULTIVITAMIN ADULTS PO)    rizatriptan (MAXALT-MLT) 10 MG ODT    verapamil ER (CALAN-SR) 120 MG CR tablet      Current Facility-Administered Medications   Medication    [START ON 7/3/2023] Botulinum Toxin Type A (BOTOX) 200 units injection 150 Units       PHYSICAL EXAMINATION:  Focused on the arms  -- Dorsal bilateral arms with pink atrophic macules with collarettes of scaling.           Please do not hesitate to contact me if you have any questions/concerns.     Sincerely,       Syeda Rowe MD

## 2023-06-22 NOTE — PROGRESS NOTES
DPL:  1. Acne vulgaris, lower facial. Topical adapalene. Past spironolactone.   2. Seborrheic dermatitis with predominant pruritus.   3. DSAP- past history of PDT      ASSESSMENT AND PLAN:   1.  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  Past treatment approximately 10 years ago with photodynamic therapy.  Today I prescribed lovastatin cholesterol 2%/2% to skin medicinals.     The patient to return to clinic in 1 year's time, sooner if concern.    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 He has tried PDT without clearance. Lesions are more prominent in the summer. Interested in topical options.     Patient Active Problem List   Diagnosis     NO ACTIVE PROBLEMS     Encounter for removal and reinsertion of intrauterine contraceptive device       Allergies   Allergen Reactions     Sulfasalazine Rash     Rash and elevated LFT         Current Outpatient Medications   Medication     adapalene (DIFFERIN) 0.1 % external cream     albuterol (PROAIR HFA/PROVENTIL HFA/VENTOLIN HFA) 108 (90 Base) MCG/ACT inhaler     COSENTYX SENSOREADY  MG/ML Sensoready pen     fluocinonide (LIDEX) 0.05 % external gel     IBUPROFEN PO     levonorgestrel (MIRENA) 20 MCG/24HR IUD     mometasone (ELOCON) 0.1 % external solution     Multiple Vitamins-Minerals (MULTIVITAMIN ADULTS PO)     rizatriptan (MAXALT-MLT) 10 MG ODT     verapamil ER (CALAN-SR) 120 MG CR tablet     Current Facility-Administered Medications   Medication     [START ON 7/3/2023] Botulinum Toxin Type A (BOTOX) 200 units injection 150 Units       PHYSICAL EXAMINATION:  Focused on the arms  -- Dorsal bilateral arms with pink atrophic macules with collarettes of scaling.

## 2023-07-07 ENCOUNTER — TELEPHONE (OUTPATIENT)
Dept: NEUROLOGY | Facility: CLINIC | Age: 43
End: 2023-07-07
Payer: COMMERCIAL

## 2023-07-07 NOTE — TELEPHONE ENCOUNTER
LVM for patient to call back to schedule botox appointment in 2-3 weeks with Rufina Mcbride. Needs to be out a couple weeks to allow for insurance authorization.

## 2023-08-09 ENCOUNTER — ANCILLARY PROCEDURE (OUTPATIENT)
Dept: MAMMOGRAPHY | Facility: CLINIC | Age: 43
End: 2023-08-09
Attending: INTERNAL MEDICINE
Payer: COMMERCIAL

## 2023-08-09 DIAGNOSIS — Z12.31 VISIT FOR SCREENING MAMMOGRAM: ICD-10-CM

## 2023-08-09 PROCEDURE — 77067 SCR MAMMO BI INCL CAD: CPT | Mod: GC

## 2023-08-09 PROCEDURE — 77063 BREAST TOMOSYNTHESIS BI: CPT | Mod: GC

## 2023-08-24 ENCOUNTER — OFFICE VISIT (OUTPATIENT)
Dept: NEUROLOGY | Facility: CLINIC | Age: 43
End: 2023-08-24
Payer: COMMERCIAL

## 2023-08-24 DIAGNOSIS — G43.709 CHRONIC MIGRAINE WITHOUT AURA WITHOUT STATUS MIGRAINOSUS, NOT INTRACTABLE: Primary | ICD-10-CM
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
## 2023-08-24 PROCEDURE — 64615 CHEMODENERV MUSC MIGRAINE: CPT

## 2023-08-24 RX ORDER — FLUOCINONIDE GEL 0.5 MG/G
GEL TOPICAL
Qty: 30 G | Refills: 11 | Status: SHIPPED | OUTPATIENT
Start: 2023-08-24 | End: 2023-08-25

## 2023-08-24 NOTE — PROGRESS NOTES
[facility]  Botulinum Toxin Procedure    Rufina Husain PA-C  Headache Neurology    August 24, 2023    Procedure: OnabotulinumtoxinA injections for chronic migraine  Indication: Chronic migraine    Lexii Mendoza suffers from severe intractable headaches. She was referred by Rufina Husain PA-C for onabotulinumtoxinA injections for headache.      Her headaches meet criteria for chronic migraine. At baseline, headaches are right-sided aching or throbbing pain. At times she can have tightness across the occiput, at the back of her neck. Headaches last greater than 4 hours in duration. She has associated photophobia, phonophobia.     Prior to initiation of botulinum toxin injections, Lexii reported 15+/30 headache days per month, with 8+/30 severe headache days per month. Her headaches are quite disabling and often interfere with her ability to function normally.    This is her first round of injections today.    She has attempted other migraine prophylactic treatments in the past, which have included: topiramate, verapamil.     She currently takes verapamil for headache prevention.    Patient's pain was assessed prior to the procedure. She rated her pain today as 0 out of 10.      The procedure was explained to the patient. Benefits of the treatment were discussed including headache and migraine reduction. Risks of the procedure were reviewed including but not limited to pain, bruising, bleeding, infection, and weakness of muscles injected or those distal to injection sites. Alternatives were discussed. The patient voiced understanding of the risks and benefits. All questions answered and patient consented to proceed.    Procedural Pause: Procedural pause was conducted to verify correct patient identity, procedure to be performed, correct side and site, correct patient position, and special requirements. Appropriate hand hygiene was utilized, and each injection site was prepped with alcohol wipes or Chloraprep  swab.     Procedure Details:   200 units of onabotulinumtoxinA was diluted in 4 mL 0.9% normal saline.   A total of 150 units of onabotulinumtoxinA were injected using 30 gauge 0.5 inch needles into the muscles listed below. 50 units of onabotulinumtoxinA were wasted.     Injection Sites: Total = 150 units onabotulinumtoxinA     Procerus muscles - 5 units into the procerus muscle (5 units total)     muscles - 5 units into the left  muscle and 5 units into the right  muscle (1 injection site per muscle) (10 units total)    Frontalis muscles - 5 units into the left superior frontalis muscle and 5 units into the right superior frontalis muscle (2 injection sites per muscle) (10 units total)    Temporalis muscles - 12.5 units into the left temporalis muscle and 12.5 units into the right temporalis muscle (2 injection sites per muscle) (25 units total)    Occipitalis muscles - 12.5 units into the left occipitalis muscle and 12.5 units into the right occipitalis muscle (2 injection sites per muscle) (25 units total)    Splenius Capitis muscles - 12.5 units into the left splenius capitis muscle and 12.5 units into the right splenius capitis muscle (2 injection sites per muscle, divided into 2/3 anteriorly and 1/3 posteriorly) (25 units total)      Trapezius muscles - 25 units into the left trapezius muscle and 25 units into the right trapezius muscle (3 injection sites per muscle, divided into 5 units, 10 units, 10 units, medial to lateral) (50 units total)      Patient tolerated the procedure well without immediate complications. She will follow up in clinic for assessment of the effectiveness of treatment. She did not report any change in her pain level after the botulinumtoxinA injection procedure.      Rufina Husain PA-C  Headache Neurology  [facility]

## 2023-08-25 ENCOUNTER — LAB (OUTPATIENT)
Dept: LAB | Facility: CLINIC | Age: 43
End: 2023-08-25
Payer: COMMERCIAL

## 2023-08-25 DIAGNOSIS — Z13.220 SCREENING CHOLESTEROL LEVEL: ICD-10-CM
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
## 2023-08-25 LAB
CHOLEST SERPL-MCNC: 200 MG/DL
HDLC SERPL-MCNC: 82 MG/DL
LDLC SERPL CALC-MCNC: 109 MG/DL
NONHDLC SERPL-MCNC: 118 MG/DL
TRIGL SERPL-MCNC: 47 MG/DL

## 2023-08-25 PROCEDURE — 36415 COLL VENOUS BLD VENIPUNCTURE: CPT

## 2023-08-25 PROCEDURE — 80061 LIPID PANEL: CPT

## 2023-08-25 RX ORDER — FLUOCINONIDE GEL 0.5 MG/G
GEL TOPICAL
Qty: 30 G | Refills: 11 | Status: SHIPPED | OUTPATIENT
Start: 2023-08-25 | End: 2024-09-10

## 2023-08-25 NOTE — TELEPHONE ENCOUNTER
Refill requested for fluocinonide. Routing to [facility] to sign.    Carmencita Rod, [facility]

## 2023-09-13 ENCOUNTER — TRANSFERRED RECORDS (OUTPATIENT)
Dept: HEALTH INFORMATION MANAGEMENT | Facility: CLINIC | Age: 43
End: 2023-09-13
Payer: COMMERCIAL

## 2023-09-13 LAB
ALT SERPL-CCNC: 56 IU/L (ref 5–35)
AST SERPL-CCNC: 64 U/L (ref 5–34)
CREATININE (EXTERNAL): 0.73 MG/DL (ref 0.5–1.3)

## 2023-09-19 DIAGNOSIS — G43.001 MIGRAINE WITHOUT AURA AND WITH STATUS MIGRAINOSUS, NOT INTRACTABLE: ICD-10-CM

## 2023-09-21 RX ORDER — VERAPAMIL HYDROCHLORIDE 120 MG/1
TABLET, FILM COATED, EXTENDED RELEASE ORAL
Qty: 90 TABLET | Refills: 0 | Status: SHIPPED | OUTPATIENT
Start: 2023-09-21 | End: 2023-11-30

## 2023-09-21 NOTE — TELEPHONE ENCOUNTER
Prescription approved per [facility] Refill Protocol.  Venecia CAMPOVERDE RN, BSN

## 2023-10-17 NOTE — LETTER
6/19/2023         RE: Lexii Mendoza  [address] [place] [address]        Dear Colleague,    Thank you for referring your patient, Lexii Mendoza, to the [facility] NEUROLOGY CLINICS [facility]. Please see a copy of my visit note below.    [facility]    Headache Neurology Progress Note    June 19, 2023    Subjective:    Lexii presents for follow up of migraine without aura.     At last visit 3/15/23, she had been experiencing more frequent migraines (25/30 headache days per month with 12/30 severe headache days per month). MRI and MRV brain were both reassuring. Topiramate was trialed for headache prophylaxis but not tolerated due to cognitive fog. She has since started verapamil, as this may have benefit for both headaches and her Raynaud's.     At baseline, headaches are right-sided aching or throbbing pain. At times she can have tightness across the occiput, at the back of her neck. She has associated photophobia, phonophobia.     She reports a slight improvement in her headache frequency since last visit and is so far tolerating verapamil well.     Lexii currently reports 15+/30 headache days per month, with 8+/30 severe headache days per month.     She has been monitoring her heart rate on her smart watch and notes that at times, it can be in the mid-40s. At baseline, her pulse and blood pressure are low.     She continues to experience migraine attacks in 3-4 day clusters about twice per month. She is having a few other headaches lasting less than one day occur throughout the week between her more prolonged migraine attacks.       Current headache treatments:  Acute therapies:  - Ibuprofen  - Rizatriptan 10 mg    Preventative therapies:  - Verapamil - mildly effective    Supportive therapies:    Previous treatments tried:  Acute therapies:    Preventative therapies:  - Topiramate - somewhat effective for headaches but not tolerated due to cognitive  Transitional Care Coordination Progress Note:  Plan per Medical/Surgical team: treatment of hyperkalemia with dialysis  Status:inpatient  Payor source: angelica PERDOMO  Discharge disposition: [facility]  Potential Barriers: dementia  ADOD: 10/19/2023  SHEFALI Lux RN, BSN Transitional Care Coordinator ED# [phone]      10/17/23 0917   Discharge Planning   Living Arrangements Alone   Support Systems Family members   Assistance Needed HemoDialysis T/TH/S @ [facility] under Dr. Knox   Type of Residence Skilled nursing facility   Do you have animals or pets at home? No   Home or Post Acute Services Post acute facilities (Rehab/SNF/etc)   Type of Post Acute Facility Services Skilled nursing   Patient expects to be discharged to: [facility]   Does the patient need discharge transport arranged? Yes   RoundTrip coordination needed? Yes   Has discharge transport been arranged? No   Financial Resource Strain   How hard is it for you to pay for the very basics like food, housing, medical care, and heating? Not hard   Housing Stability   In the last 12 months, was there a time when you were not able to pay the mortgage or rent on time? N   In the last 12 months, how many places have you lived? 1   In the last 12 months, was there a time when you did not have a steady place to sleep or slept in a shelter (including now)? N   Transportation Needs   In the past 12 months, has lack of transportation kept you from medical appointments or from getting medications? no   In the past 12 months, has lack of transportation kept you from meetings, work, or from getting things needed for daily living? No   Patient Choice   Provider Choice list and CMS website (https://medicare.gov/care-compare#search) for post-acute Quality and Resource Measure Data were provided and reviewed with: Family   Patient / Family choosing to utilize agency / facility established prior to hospitalization Yes        fog    Supportive therapies:      Objective:    Vitals: There were no vitals taken for this visit.  General: Cooperative, NAD  Neurologic Exam:  Mental Status: Fully alert, attentive and oriented. Speech is clear and fluent.   Cranial Nerves: Facial movements symmetric.   Motor: No abnormal movements.      Pertinent Investigations:    MRI/MRV Brain (3/28/23)  HEAD MRI:  1.  No acute/subacute infarction, intracranial hemorrhage, mass effect, hydrocephalus, or pathologic enhancement.  2.  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     HEAD MRV:  1.  No dural venous sinus thrombosis or significant stenosis.    Assessment and Plan:   Lexii is a 43 year old female who presents for follow-up of migraine headache.     She as continued to experience more frequent migraines, now meeting criteria for chronic migraine without aura.     We discussed the following treatment strategy:  - For acute treatment of mild headache, she may use ibuprofen as needed, not to exceed 14 days per month to avoid medication overuse.   - For acute treatment of moderate to severe headache, she may use rizatriptan 10 mg taken at onset of headache with a repeat dose taken after 2 hours if needd. Use should not exceed 9 days per month to avoid medication overuse.    Her current frequency and severity of headaches warrant prevention.  - Topiramate trial was failed due to side effects. Verapamil has been somewhat helpful, but she continues to experience frequent and severe headaches.   - Discussed increasing verapamil, but given her low heart rates at current dose, I do not recommend increasing dose further.  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   - I recommend a trial of botulinum toxin injection following a chronic migraine protocol administered every 12 weeks. We will work on prior authorization of this. I recommend a trial of 3  rounds prior to determining effectiveness.   - If Botox is not tolerated or not effective, could consider CGRP inhibitors.   - In the meantime, she may also consider supplementation of magnesium and/or riboflavin for headache prevention. I recommend 400 mg of each daily.    I will plan to see her back in a few weeks for first round of injections.     Jatin Husain PA-C  Headache Neurology  [facility]        Virtual Visit Details    Type of service:  Video Visit     Originating Location (pt. Location): Home    Distant Location (provider location):  Off-site  Platform used for Video Visit: Kenia      Again, thank you for allowing me to participate in the care of your patient.        Sincerely,        JATIN HUSAIN PA-C

## 2023-11-12 ENCOUNTER — MYC REFILL (OUTPATIENT)
Dept: NEUROLOGY | Facility: CLINIC | Age: 43
End: 2023-11-12
Payer: COMMERCIAL

## 2023-11-12 DIAGNOSIS — G43.001 MIGRAINE WITHOUT AURA AND WITH STATUS MIGRAINOSUS, NOT INTRACTABLE: ICD-10-CM

## 2023-11-15 RX ORDER — RIZATRIPTAN BENZOATE 10 MG/1
10 TABLET, ORALLY DISINTEGRATING ORAL
Qty: 18 TABLET | Refills: 11 | Status: SHIPPED | OUTPATIENT
Start: 2023-11-15 | End: 2024-06-10

## 2023-11-23 NOTE — LETTER
8/24/2023         RE: Lexii Mendoza  [address] [place] [address]        Dear Colleague,    Thank you for referring your patient, Lexii Mendoza, to the [facility] NEUROLOGY CLINICS [facility]. Please see a copy of my visit note below.    [facility]  Botulinum Toxin Procedure    Rufina Husain PA-C  Headache Neurology    August 24, 2023    Procedure: OnabotulinumtoxinA injections for chronic migraine  Indication: Chronic migraine    Lexii Mendoza suffers from severe intractable headaches. She was referred by Rufina Husain PA-C for onabotulinumtoxinA injections for headache.      Her headaches meet criteria for chronic migraine. At baseline, headaches are right-sided aching or throbbing pain. At times she can have tightness across the occiput, at the back of her neck. Headaches last greater than 4 hours in duration. She has associated photophobia, phonophobia.     Prior to initiation of botulinum toxin injections, Lexii reported 15+/30 headache days per month, with 8+/30 severe headache days per month. Her headaches are quite disabling and often interfere with her ability to function normally.    This is her first round of injections today.    She has attempted other migraine prophylactic treatments in the past, which have included: topiramate, verapamil.     She currently takes verapamil for headache prevention.    Patient's pain was assessed prior to the procedure. She rated her pain today as 0 out of 10.      The procedure was explained to the patient. Benefits of the treatment were discussed including headache and migraine reduction. Risks of the procedure were reviewed including but not limited to pain, bruising, bleeding, infection, and weakness of muscles injected or those distal to injection sites. Alternatives were discussed. The patient voiced understanding of the risks and benefits. All questions answered and patient consented to proceed.    Procedural Pause: Procedural pause  was conducted to verify correct patient identity, procedure to be performed, correct side and site, correct patient position, and special requirements. Appropriate hand hygiene was utilized, and each injection site was prepped with alcohol wipes or Chloraprep swab.     Procedure Details:   200 units of onabotulinumtoxinA was diluted in 4 mL 0.9% normal saline.   A total of 150 units of onabotulinumtoxinA were injected using 30 gauge 0.5 inch needles into the muscles listed below. 50 units of onabotulinumtoxinA were wasted.     Injection Sites: Total = 150 units onabotulinumtoxinA     Procerus muscles - 5 units into the procerus muscle (5 units total)     muscles - 5 units into the left  muscle and 5 units into the right  muscle (1 injection site per muscle) (10 units total)    Frontalis muscles - 5 units into the left superior frontalis muscle and 5 units into the right superior frontalis muscle (2 injection sites per muscle) (10 units total)    Temporalis muscles - 12.5 units into the left temporalis muscle and 12.5 units into the right temporalis muscle (2 injection sites per muscle) (25 units total)    Occipitalis muscles - 12.5 units into the left occipitalis muscle and 12.5 units into the right occipitalis muscle (2 injection sites per muscle) (25 units total)    Splenius Capitis muscles - 12.5 units into the left splenius capitis muscle and 12.5 units into the right splenius capitis muscle (2 injection sites per muscle, divided into 2/3 anteriorly and 1/3 posteriorly) (25 units total)      Trapezius muscles - 25 units into the left trapezius muscle and 25 units into the right trapezius muscle (3 injection sites per muscle, divided into 5 units, 10 units, 10 units, medial to lateral) (50 units total)      Patient tolerated the procedure well without immediate complications. She will follow up in clinic for assessment of the effectiveness of treatment. She did not report any change  in her pain level after the botulinumtoxinA injection procedure.      Jatin Husain PA-C  Headache Neurology  [facility]      Again, thank you for allowing me to participate in the care of your patient.        Sincerely,        JATIN HUSAIN PA-C   no

## 2023-11-30 ENCOUNTER — OFFICE VISIT (OUTPATIENT)
Dept: NEUROLOGY | Facility: CLINIC | Age: 43
End: 2023-11-30
Payer: COMMERCIAL

## 2023-11-30 DIAGNOSIS — G43.709 CHRONIC MIGRAINE WITHOUT AURA WITHOUT STATUS MIGRAINOSUS, NOT INTRACTABLE: Primary | ICD-10-CM

## 2023-11-30 PROCEDURE — 64615 CHEMODENERV MUSC MIGRAINE: CPT

## 2023-11-30 RX ORDER — NARATRIPTAN 2.5 MG/1
2.5 TABLET ORAL
Qty: 18 TABLET | Refills: 3 | Status: SHIPPED | OUTPATIENT
Start: 2023-11-30 | End: 2024-03-04

## 2023-11-30 NOTE — LETTER
11/30/2023         RE: Lexii Mendoza  [address] [place] [address]        Dear Colleague,    Thank you for referring your patient, Lexii Mendoza, to the [facility] NEUROLOGY CLINICS [facility]. Please see a copy of my visit note below.    [facility]  Botulinum Toxin Procedure    Rufina Husain PA-C  Headache Neurology    November 30, 2023    Procedure: OnabotulinumtoxinA injections for chronic migraine  Indication: Chronic migraine    Lexii Mendoza suffers from severe intractable headaches. She was referred by Rufina Husain PA-C for onabotulinumtoxinA injections for headache.       Her headaches meet criteria for chronic migraine. At baseline, headaches are right-sided aching or throbbing pain. At times she can have tightness across the occiput, at the back of her neck. Headaches last greater than 4 hours in duration. She has associated photophobia, phonophobia.      Prior to initiation of botulinum toxin injections, Lexii reported 15+/30 headache days per month, with 8+/30 severe headache days per month. Her headaches are quite disabling and often interfere with her ability to function normally.     Their last round of botulinum toxin injections was on 8/24/2023. This is her 2nd round of injections today.    Lexii reports 15/30 headache days per month currently, with 8/30 severe headache days per month.     Lexii reports the following benefits of botulinum toxin injections from their last round: Not sure if any benefit yet, but still in trial round. Willing to continue with 2nd and 3rd rounds.     She has attempted other migraine prophylactic treatments in the past, which have included: topiramate (side effects), verapamil (not effective).     She currently takes no other medications for headache prevention.    Patient's pain was assessed prior to the procedure. She rated her pain today as 0 out of 10.      The procedure was explained to the patient. Benefits of the treatment were  discussed including headache and migraine reduction. Risks of the procedure were reviewed including but not limited to pain, bruising, bleeding, infection, and weakness of muscles injected or those distal to injection sites. Alternatives were discussed. The patient voiced understanding of the risks and benefits. All questions answered and patient consented to proceed.    Procedural Pause: Procedural pause was conducted to verify correct patient identity, procedure to be performed, correct side and site, correct patient position, and special requirements. Appropriate hand hygiene was utilized, and each injection site was prepped with alcohol wipes or Chloraprep swab.     Procedure Details:   200 units of onabotulinumtoxinA was diluted in 4 mL 0.9% normal saline.   A total of 150 units of onabotulinumtoxinA were injected using 30 gauge 0.5 inch needles into the muscles listed below. 50 units of onabotulinumtoxinA were wasted.     Injection Sites: Total = 150 units onabotulinumtoxinA     Procerus muscles - 5 units into the procerus muscle (5 units total)     muscles - 5 units into the left  muscle and 5 units into the right  muscle (1 injection site per muscle) (10 units total)    Frontalis muscles - 5 units into the left superior frontalis muscle and 5 units into the right superior frontalis muscle (2 injection sites per muscle) (10 units total)    Temporalis muscles - 12.5 units into the left temporalis muscle and 12.5 units into the right temporalis muscle (2 injection sites per muscle) (25 units total)    Occipitalis muscles - 12.5 units into the left occipitalis muscle and 12.5 units into the right occipitalis muscle (2 injection sites per muscle) (25 units total)    Splenius Capitis muscles - 12.5 units into the left splenius capitis muscle and 12.5 units into the right splenius capitis muscle (2 injection sites per muscle, divided into 2/3 anteriorly and 1/3 posteriorly) (25 units  total)      Trapezius muscles - 25 units into the left trapezius muscle and 25 units into the right trapezius muscle (3 injection sites per muscle, divided into 5 units, 10 units, 10 units, medial to lateral) (50 units total)      Patient tolerated the procedure well without immediate complications. She will follow up in clinic for assessment of the effectiveness of treatment. She did not report any change in her pain level after the botulinumtoxinA injection procedure.    She note rizatriptan is very short acting, though helpful, but is wondering if there is a longer-acting triptan to try. Recommend a trial of naratriptan 2.5 mg at onset of headache with repeat dose after 4 hours if needed.      Jatin Husain PA-C  Headache Neurology  [facility] Neurology [facility]      Again, thank you for allowing me to participate in the care of your patient.        Sincerely,        JATIN HUSAIN PA-C

## 2023-11-30 NOTE — PROGRESS NOTES
[facility]  Botulinum Toxin Procedure    Rufina Husain PA-C  Headache Neurology    November 30, 2023    Procedure: OnabotulinumtoxinA injections for chronic migraine  Indication: Chronic migraine    Lexii Mendoza suffers from severe intractable headaches. She was referred by Rufina Husain PA-C for onabotulinumtoxinA injections for headache.       Her headaches meet criteria for chronic migraine. At baseline, headaches are right-sided aching or throbbing pain. At times she can have tightness across the occiput, at the back of her neck. Headaches last greater than 4 hours in duration. She has associated photophobia, phonophobia.      Prior to initiation of botulinum toxin injections, Lexii reported 15+/30 headache days per month, with 8+/30 severe headache days per month. Her headaches are quite disabling and often interfere with her ability to function normally.     Their last round of botulinum toxin injections was on 8/24/2023. This is her 2nd round of injections today.    Lexii reports 15/30 headache days per month currently, with 8/30 severe headache days per month.     Lexii reports the following benefits of botulinum toxin injections from their last round: Not sure if any benefit yet, but still in trial round. Willing to continue with 2nd and 3rd rounds.     She has attempted other migraine prophylactic treatments in the past, which have included: topiramate (side effects), verapamil (not effective).     She currently takes no other medications for headache prevention.    Patient's pain was assessed prior to the procedure. She rated her pain today as 0 out of 10.      The procedure was explained to the patient. Benefits of the treatment were discussed including headache and migraine reduction. Risks of the procedure were reviewed including but not limited to pain, bruising, bleeding, infection, and weakness of muscles injected or those distal to injection sites. Alternatives were discussed. The  patient voiced understanding of the risks and benefits. All questions answered and patient consented to proceed.    Procedural Pause: Procedural pause was conducted to verify correct patient identity, procedure to be performed, correct side and site, correct patient position, and special requirements. Appropriate hand hygiene was utilized, and each injection site was prepped with alcohol wipes or Chloraprep swab.     Procedure Details:   200 units of onabotulinumtoxinA was diluted in 4 mL 0.9% normal saline.   A total of 150 units of onabotulinumtoxinA were injected using 30 gauge 0.5 inch needles into the muscles listed below. 50 units of onabotulinumtoxinA were wasted.     Injection Sites: Total = 150 units onabotulinumtoxinA     Procerus muscles - 5 units into the procerus muscle (5 units total)     muscles - 5 units into the left  muscle and 5 units into the right  muscle (1 injection site per muscle) (10 units total)    Frontalis muscles - 5 units into the left superior frontalis muscle and 5 units into the right superior frontalis muscle (2 injection sites per muscle) (10 units total)    Temporalis muscles - 12.5 units into the left temporalis muscle and 12.5 units into the right temporalis muscle (2 injection sites per muscle) (25 units total)    Occipitalis muscles - 12.5 units into the left occipitalis muscle and 12.5 units into the right occipitalis muscle (2 injection sites per muscle) (25 units total)    Splenius Capitis muscles - 12.5 units into the left splenius capitis muscle and 12.5 units into the right splenius capitis muscle (2 injection sites per muscle, divided into 2/3 anteriorly and 1/3 posteriorly) (25 units total)      Trapezius muscles - 25 units into the left trapezius muscle and 25 units into the right trapezius muscle (3 injection sites per muscle, divided into 5 units, 10 units, 10 units, medial to lateral) (50 units total)      Patient tolerated the  procedure well without immediate complications. She will follow up in clinic for assessment of the effectiveness of treatment. She did not report any change in her pain level after the botulinumtoxinA injection procedure.    She note rizatriptan is very short acting, though helpful, but is wondering if there is a longer-acting triptan to try. Recommend a trial of naratriptan 2.5 mg at onset of headache with repeat dose after 4 hours if needed.      Rufina Husain PA-C  Headache Neurology  [facility]

## 2023-12-09 ENCOUNTER — HEALTH MAINTENANCE LETTER (OUTPATIENT)
Age: 43
End: 2023-12-09

## 2024-01-22 DIAGNOSIS — G43.709 CHRONIC MIGRAINE WITHOUT AURA WITHOUT STATUS MIGRAINOSUS, NOT INTRACTABLE: Primary | ICD-10-CM

## 2024-02-23 ENCOUNTER — OFFICE VISIT (OUTPATIENT)
Dept: NEUROLOGY | Facility: CLINIC | Age: 44
End: 2024-02-23
Payer: COMMERCIAL

## 2024-02-23 DIAGNOSIS — G43.709 CHRONIC MIGRAINE WITHOUT AURA WITHOUT STATUS MIGRAINOSUS, NOT INTRACTABLE: Primary | ICD-10-CM

## 2024-02-23 PROCEDURE — 64615 CHEMODENERV MUSC MIGRAINE: CPT | Performed by: PSYCHIATRY & NEUROLOGY

## 2024-02-23 NOTE — PROGRESS NOTES
[facility]  Botulinum Toxin Procedure    Lexie Moore MD  Headache Neurology    February 23, 2024    Procedure:  OnabotulinumtoxinA injections for chronic migraine  Indication:  Chronic migraine    Lexii Mendoza suffers from severe intractable headaches. She was referred by Rufina Husain PA-C for onabotulinumtoxinA injections for headache.       Her headaches meet criteria for chronic migraine. At baseline, headaches are right-sided aching or throbbing pain. At times she can have tightness across the occiput, at the back of her neck. Headaches last greater than 4 hours in duration. She has associated photophobia, phonophobia.      Prior to initiation of botulinum toxin injections, Lexii reported 30/30 headache days per month, with 8/30 severe headache days per month. Her headaches are quite disabling and often interfere with her ability to function normally.     Their last round of botulinum toxin injections was on 11/30/23.     Lexii reports 8/30 headache days per month currently, with 7/30 severe headache days per month.         She has attempted other migraine prophylactic treatments in the past, which have included: topiramate (side effects), verapamil (not effective).      She currently takes no other medications for headache prevention.    Procedural Pause: Procedural pause was conducted to verify correct patient identity, procedure to be performed, correct side and site, correct patient position, and special requirements. Appropriate hand hygiene was utilized, and each injection site was prepped with alcohol wipe or Chloraprep swab.     Procedure Details: 200 units of onabotulinumtoxinA was diluted in 4 mL 0.9% normal saline. A total of 150 units of onabotulinumtoxinA were injected using 30 gauge 0.5 in needles into the muscles listed below. 50 units of onabotulinumtoxinA were wasted.     Injection Sites: Total = 150 units onabotulinumtoxinA      and Procerus muscles - 5 units into the  left and right corrugators and 5 units into the procerus (15 units total)    **Frontalis muscles - 5 units into the left superior frontalis and 5 units into the right superior frontalis (2 injection sites per muscle) (10 units total) **moved anteriorly to avoid dropping    Temporalis muscles - 12.5 units into the left temporalis muscle and 12.5 units into the right temporalis muscle (2 injection sites per muscle) (25 units total)    Occipitalis muscles - 12.5 units into the left occipitalis muscle and 12.5 units into the right occipitalis muscle (2 injection sites per muscle) (25 units total)    Splenius Capitis muscles - 12.5 units into the left splenius capitis muscle and 12.5 units into the right splenius capitis muscle (2 injection sites per muscle, divided into 2/3 anteriorly and 1/3 posteriorly) (25 units total)      Trapezius muscles - 25 units into the left trapezius muscle and 25 units into the right trapezius muscle (3 injection sites per muscle, divided 5 units, 10 units, 10 units, medial to lateral) (50 units total)    Ms. Mendoza tolerated the procedure well without immediate complications.  She will follow up in clinic for assessment of the effectiveness of treatment.  She did not report any change in her pain level after the botulinumtoxinA injection procedure.    Lexie Moore MD  Headache Neurology  [facility]

## 2024-02-23 NOTE — LETTER
2/23/2024         RE: Lexii Mendoza  [address] [place] [address]        Dear Colleague,    Thank you for referring your patient, Lexii Mendoza, to the [facility]. Please see a copy of my visit note below.    [facility]  Botulinum Toxin Procedure    Lexie Moore MD  Headache Neurology    February 23, 2024    Procedure:  OnabotulinumtoxinA injections for chronic migraine  Indication:  Chronic migraine    Lexii Mendoza suffers from severe intractable headaches. She was referred by Rufina Husain PA-C for onabotulinumtoxinA injections for headache.       Her headaches meet criteria for chronic migraine. At baseline, headaches are right-sided aching or throbbing pain. At times she can have tightness across the occiput, at the back of her neck. Headaches last greater than 4 hours in duration. She has associated photophobia, phonophobia.      Prior to initiation of botulinum toxin injections, Lexii reported 30/30 headache days per month, with 8/30 severe headache days per month. Her headaches are quite disabling and often interfere with her ability to function normally.     Their last round of botulinum toxin injections was on 11/30/23.     Lexii reports 8/30 headache days per month currently, with 7/30 severe headache days per month.         She has attempted other migraine prophylactic treatments in the past, which have included: topiramate (side effects), verapamil (not effective).      She currently takes no other medications for headache prevention.    Procedural Pause: Procedural pause was conducted to verify correct patient identity, procedure to be performed, correct side and site, correct patient position, and special requirements. Appropriate hand hygiene was utilized, and each injection site was prepped with alcohol wipe or Chloraprep swab.     Procedure Details: 200 units of onabotulinumtoxinA was diluted in 4 mL 0.9% normal saline. A total of 150  units of onabotulinumtoxinA were injected using 30 gauge 0.5 in needles into the muscles listed below. 50 units of onabotulinumtoxinA were wasted.     Injection Sites: Total = 150 units onabotulinumtoxinA      and Procerus muscles - 5 units into the left and right corrugators and 5 units into the procerus (15 units total)    **Frontalis muscles - 5 units into the left superior frontalis and 5 units into the right superior frontalis (2 injection sites per muscle) (10 units total) **moved anteriorly to avoid dropping    Temporalis muscles - 12.5 units into the left temporalis muscle and 12.5 units into the right temporalis muscle (2 injection sites per muscle) (25 units total)    Occipitalis muscles - 12.5 units into the left occipitalis muscle and 12.5 units into the right occipitalis muscle (2 injection sites per muscle) (25 units total)    Splenius Capitis muscles - 12.5 units into the left splenius capitis muscle and 12.5 units into the right splenius capitis muscle (2 injection sites per muscle, divided into 2/3 anteriorly and 1/3 posteriorly) (25 units total)      Trapezius muscles - 25 units into the left trapezius muscle and 25 units into the right trapezius muscle (3 injection sites per muscle, divided 5 units, 10 units, 10 units, medial to lateral) (50 units total)    Ms. Mendoza tolerated the procedure well without immediate complications.  She will follow up in clinic for assessment of the effectiveness of treatment.  She did not report any change in her pain level after the botulinumtoxinA injection procedure.    Lexie Moore MD  Headache Neurology  [facility]      Again, thank you for allowing me to participate in the care of your patient.        Sincerely,        Lexie Moore MD

## 2024-02-29 SDOH — HEALTH STABILITY: PHYSICAL HEALTH: ON AVERAGE, HOW MANY MINUTES DO YOU ENGAGE IN EXERCISE AT THIS LEVEL?: 30 MIN

## 2024-02-29 SDOH — HEALTH STABILITY: PHYSICAL HEALTH: ON AVERAGE, HOW MANY DAYS PER WEEK DO YOU ENGAGE IN MODERATE TO STRENUOUS EXERCISE (LIKE A BRISK WALK)?: 3 DAYS

## 2024-02-29 ASSESSMENT — SOCIAL DETERMINANTS OF HEALTH (SDOH): HOW OFTEN DO YOU GET TOGETHER WITH FRIENDS OR RELATIVES?: TWICE A WEEK

## 2024-03-01 ENCOUNTER — OFFICE VISIT (OUTPATIENT)
Dept: INTERNAL MEDICINE | Facility: CLINIC | Age: 44
End: 2024-03-01
Payer: COMMERCIAL

## 2024-03-01 VITALS
SYSTOLIC BLOOD PRESSURE: 130 MMHG | BODY MASS INDEX: 19.45 KG/M2 | HEIGHT: 67 IN | HEART RATE: 72 BPM | DIASTOLIC BLOOD PRESSURE: 74 MMHG | WEIGHT: 123.9 LBS | OXYGEN SATURATION: 97 %

## 2024-03-01 DIAGNOSIS — Z12.4 CERVICAL CANCER SCREENING: Primary | ICD-10-CM

## 2024-03-01 DIAGNOSIS — Z00.00 ROUTINE GENERAL MEDICAL EXAMINATION AT A HEALTH CARE FACILITY: ICD-10-CM

## 2024-03-01 DIAGNOSIS — Z12.11 COLON CANCER SCREENING: ICD-10-CM

## 2024-03-01 PROCEDURE — 90715 TDAP VACCINE 7 YRS/> IM: CPT | Performed by: INTERNAL MEDICINE

## 2024-03-01 PROCEDURE — 99396 PREV VISIT EST AGE 40-64: CPT | Mod: 24 | Performed by: INTERNAL MEDICINE

## 2024-03-01 PROCEDURE — 90471 IMMUNIZATION ADMIN: CPT | Performed by: INTERNAL MEDICINE

## 2024-03-01 PROCEDURE — 90651 9VHPV VACCINE 2/3 DOSE IM: CPT | Performed by: INTERNAL MEDICINE

## 2024-03-01 PROCEDURE — 90472 IMMUNIZATION ADMIN EACH ADD: CPT | Performed by: INTERNAL MEDICINE

## 2024-03-03 DIAGNOSIS — G43.709 CHRONIC MIGRAINE WITHOUT AURA WITHOUT STATUS MIGRAINOSUS, NOT INTRACTABLE: ICD-10-CM
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
## 2024-03-04 RX ORDER — NARATRIPTAN 2.5 MG/1
TABLET ORAL
Qty: 54 TABLET | Refills: 0 | Status: SHIPPED | OUTPATIENT
Start: 2024-03-04 | End: 2024-06-10 | Stop reason: SINTOL

## 2024-03-04 RX ORDER — MOMETASONE FUROATE 1 MG/ML
SOLUTION TOPICAL
Qty: 60 ML | Refills: 11 | Status: SHIPPED | OUTPATIENT
Start: 2024-03-04

## 2024-03-04 RX ORDER — MOMETASONE FUROATE 1 MG/ML
SOLUTION TOPICAL
Qty: 180 ML | OUTPATIENT
Start: 2024-03-04

## 2024-03-04 NOTE — TELEPHONE ENCOUNTER
Prescription approved per [facility] Refill Protocol.    Will send message to pt, as pt is coming due in June for med follow up appointment.     Venecia CAMPOVERDE RN, BSN  [facility]

## 2024-03-13 ENCOUNTER — TRANSFERRED RECORDS (OUTPATIENT)
Dept: HEALTH INFORMATION MANAGEMENT | Facility: CLINIC | Age: 44
End: 2024-03-13
Payer: COMMERCIAL

## 2024-03-13 LAB
ALT SERPL-CCNC: 26 IU/L (ref 5–35)
AST SERPL-CCNC: 37 U/L (ref 5–34)
CREATININE (EXTERNAL): 0.76 MG/DL (ref 0.5–1.3)

## 2024-03-14 ENCOUNTER — TELEPHONE (OUTPATIENT)
Dept: GASTROENTEROLOGY | Facility: CLINIC | Age: 44
End: 2024-03-14
Payer: COMMERCIAL

## 2024-03-14 DIAGNOSIS — L21.9 DERMATITIS, SEBORRHEIC: Primary | ICD-10-CM

## 2024-03-14 RX ORDER — KETOCONAZOLE 20 MG/ML
SHAMPOO TOPICAL
Qty: 120 ML | Refills: 11 | Status: SHIPPED | OUTPATIENT
Start: 2024-03-14

## 2024-03-14 NOTE — TELEPHONE ENCOUNTER
"Endoscopy Scheduling Screen    Have you had a positive Covid test in the last 14 days?  No    What is your communication preference for Instructions and/or Bowel Prep?   MyChart    What insurance is in the chart?  Other:  [employer]    Ordering/Referring Provider: CHRIS MCKEON   (If ordering provider performs procedure, schedule with ordering provider unless otherwise instructed. )    BMI: Estimated body mass index is 19.22 kg/m  as calculated from the following:    Height as of 3/1/24: 1.71 m (5' 7.32\").    Weight as of 3/1/24: 56.2 kg (123 lb 14.4 oz).     Sedation Ordered  moderate sedation.   If patient BMI > 50 do not schedule in [facility].    If patient BMI > 45 do not schedule at [facility].    Are you taking methadone or Suboxone?  No    Have you had difficulties, pain, or discomfort during past endoscopy procedures?  No    Are you taking any prescription medications for pain 3 or more times per week?   NO, No RN review required.    Do you have a history of malignant hyperthermia?  No    (Females) Are you currently pregnant?   No     Have you been diagnosed or told you have pulmonary hypertension?   No    Do you have an LVAD?  No    Have you been told you have moderate to severe sleep apnea?  No    Have you been told you have COPD, asthma, or any other lung disease?  No    Do you have any heart conditions?  No     Have you ever had or are you waiting for an organ transplant?  No. Continue scheduling, no site restrictions.    Have you had a stroke or transient ischemic attack (TIA aka \"mini stroke\" in the last 6 months?   No    Have you been diagnosed with or been told you have cirrhosis of the liver?   No    Are you currently on dialysis?   No    Do you need assistance transferring?   No    BMI: Estimated body mass index is 19.22 kg/m  as calculated from the following:    Height as of 3/1/24: 1.71 m (5' 7.32\").    Weight as of 3/1/24: 56.2 kg (123 lb 14.4 oz).     Is patients BMI > 40 and scheduling location " [facility]?  No    Do you take an injectable medication for weight loss or diabetes (excluding insulin)?  No    Do you take the medication Naltrexone?  No    Do you take blood thinners?  No       Prep   Are you currently on dialysis or do you have chronic kidney disease?  No    Do you have a diagnosis of diabetes?  No    Do you have a diagnosis of cystic fibrosis (CF)?  No    On a regular basis do you go 3 -5 days between bowel movements?  No    BMI > 40?  No    Preferred Pharmacy:    "[id number] - [place], [place] - [address] AT [facility] [address]  [address]  [place] [address]  Phone: [phone] Fax: [phone]      Final Scheduling Details     Procedure scheduled  Colonoscopy    Surgeon:  ZULEYKA     Date of procedure:  6/13/24     Pre-OP / PAC:   No - Not required for this site.    Location  [facility] - Patient preference.    Sedation   Moderate Sedation - Per order.      Patient Reminders:   You will receive a call from a Nurse to review instructions and health history.  This assessment must be completed prior to your procedure.  Failure to complete the Nurse assessment may result in the procedure being cancelled.      On the day of your procedure, please designate an adult(s) who can drive you home stay with you for the next 24 hours. The medicines used in the exam will make you sleepy. You will not be able to drive.      You cannot take public transportation, ride share services, or non-medical taxi service without a responsible caregiver.  Medical transport services are allowed with the requirement that a responsible caregiver will receive you at your destination.  We require that drivers and caregivers are confirmed prior to your procedure.

## 2024-03-18 ENCOUNTER — OFFICE VISIT (OUTPATIENT)
Dept: DERMATOLOGY | Facility: CLINIC | Age: 44
End: 2024-03-18
Attending: DERMATOLOGY
Payer: COMMERCIAL

## 2024-03-18 DIAGNOSIS — D49.2 SKIN NEOPLASM: ICD-10-CM

## 2024-03-18 PROCEDURE — 250N000011 HC RX IP 250 OP 636: Performed by: DERMATOLOGY

## 2024-03-18 PROCEDURE — 11104 PUNCH BX SKIN SINGLE LESION: CPT | Performed by: DERMATOLOGY

## 2024-03-18 PROCEDURE — 11900 INJECT SKIN LESIONS </W 7: CPT | Performed by: DERMATOLOGY

## 2024-03-18 PROCEDURE — 88305 TISSUE EXAM BY PATHOLOGIST: CPT | Mod: TC | Performed by: DERMATOLOGY

## 2024-03-18 PROCEDURE — 88305 TISSUE EXAM BY PATHOLOGIST: CPT | Mod: 26 | Performed by: DERMATOLOGY

## 2024-03-18 RX ADMIN — TRIAMCINOLONE ACETONIDE 2 MG: 10 INJECTION, SUSPENSION INTRA-ARTICULAR; INTRALESIONAL at 15:59

## 2024-03-18 NOTE — PATIENT INSTRUCTIONS
[facility]- Pediatric Dermatology  Dr. Lexii Mendoza, Dr. Pete Garcia, Dr. Syeda Rowe Dr., TOY Bell [address] & Dr. Desiree Simms    Non Urgent  Nurse Triage Line; [phone]- Radha and Ynes RN Care Coordinators    Saritha (/Complex ) [phone]    If you need a prescription refill, please contact your pharmacy. Refills are approved or denied by our Physicians during normal business hours, Monday through Fridays  Per office policy, refills will not be granted if you have not been seen within the past year (or sooner depending on your child's condition)      Scheduling Information:   Pediatric Appointment Scheduling and Call Center [phone]   Radiology Scheduling- [phone]   Sedation Unit Scheduling- [phone]  Main  Services: [phone]   Serbian: [phone]   Zimbabwean: [phone]   Hmong/Ramón/Tajik: [phone]    Preadmission Nursing Department Fax Number: [phone] (Fax all pre-operative paperwork to this number)      For urgent matters arising during evenings, weekends, or holidays that cannot wait for normal business hours please call [phone] and ask for the Dermatology Resident On-Call to be paged.          Pediatric Dermatology   [address] [address]  [phone]    Skin Biopsy    Biopsy - How to take care of the site?  Keep the biopsy site dry and covered for 24 hours.   After 24 hours you may remove the bandage and clean the site (in the bathtub or shower)   If any discomfort occurs after the local anesthetic wears off, acetaminophen (i.e. Tylenol) may be given.  Apply the vaseline at least once a day with a cotton swab or a clean finger, and keep the site covered with a bandage.   If you are unable to cover the site with a bandage, re-apply ointment 2-3 times a day to keep the site moist. We do NOT want crusting of the site.  Moisture will help with healing.  The best time to do wound care is after a shower or bath. You may shower or bathe the day after the biopsy and you can get the site wet. However, keep the force of the water off the biopsy site. Do not soak the area in water.  Change the bandage if it gets wet or sweaty.   A small scab will form and fall off by itself when the area is completely healed. The area will be red, and will become pink in color as it heals. Sun protection is very important for how your scar will heal. Either cover the scar from the sun or wear sunscreen SPF 30 or greater.   AVOID lake swimming until the sutures are removed if you have stiches.   You may swim in a chlorinated pool after your sutures have been in for 5 days. Try to use an occlusive bandage but if not, remove the bandage immediately after swimming and clean the site with a gentle cleanser and redress the site.     If a small amount of bleeding is noticed, place a clean cloth over the area and apply constant firm pressure for 15 minutes-- no peeking! Should the bleeding become heavier or not stop, call the clinic at [phone] or call [phone] to have the Dermatology Resident On-Call paged if after clinic hours, holiday or weekend.    Call us if have any of the following:  Thick, yellow or pus-like wound drainage (clear, or slightly yellow drainage is ok)  Fevers greater than 100 degrees Fahrenheit  Spreading redness or warmth at the biopsy site     The biopsy results can take 2-3 weeks to come back. The clinic will call you with the results unless you have a scheduled follow up appointment, then the results will be discussed at that time.           What is a skin biopsy and the difference between the two?  A skin biopsy allows the doctor to examine a very small piece of tissue under the microscope to determine the most appropriate diagnosis and the best treatment for the skin condition. A local anesthetic, similar to the kind that your  "dentist uses when they fill a cavity, is injected with a very small needle into the skin area to be tested. The skin and tissue underneath is now, \"asleep\" or numb and no pain is felt.     Punch Skin Biopsy:  An instrument shaped like a tiny cookie cutter (punch biopsy instrument) is used to cut a small round piece of tissue and skin from the area. A slight amount of bleeding may occur. Usually, a stitch is used to close the wound.     Shave Skin Biopsy:  This is a more superficial type of test, like a deep  scrape  in the skin.  It does not require a stitch.  "

## 2024-03-18 NOTE — LETTER
3/18/2024      RE: Lexii HARTLEY Reji  [address] [place] [address]     Dear Colleague,    Thank you for the opportunity to participate in the care of your patient, Lexii Mendoza, at the [facility] at [facility]. Please see a copy of my visit note below.        DPL:  1. Acne vulgaris, lower facial. Topical adapalene. Past spironolactone.   2. Seborrheic dermatitis with predominant pruritus.   3. DSAP- past history of PDT      ASSESSMENT AND PLAN:   1.Neoplasm of uncertain behavior on the R posterior ankle. Suspected irritated dermatofibroma.  Biopsy for diagnosis and treatment with intralesional kenalog to prevent recurrence in the setting of DF.     Procedure Note:  Punch biopsy:  After discussion of benefits and risks including but not limited to bleeding/bruising, pain/swelling, infection, scar, incomplete removal, nerve damage/numbness, recurrence, and non-diagnostic biopsy, written consent was obtained. The area was cleaned with isopropyl alcohol. 1 mL of 1% lidocaine with epinephrine was injected to obtain adequate anesthesia of the lesion on the R posterior ankle.  3 mm punch biopsy was performed.  4-0 prolene sutures were utilized to approximate the epidermal edges.  White petroleum jelly/VaselineTM and a bandage was applied to the wound.  Explicit verbal and written wound care instructions were provided.  The patient left the Dermatology Clinic in good condition. The patient was counseled to follow up for suture removal in approximately 10 days. A total of 0.2 ml of intralesional kenalog 10 mg/ml injected into the biopsy site.       The patient to return to clinic prn.     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 No past treatment.     Patient Active Problem List   Diagnosis     NO ACTIVE PROBLEMS     Encounter for removal and reinsertion of intrauterine contraceptive device       Allergies   Allergen Reactions     Sulfasalazine Rash     Rash and elevated LFT         Current Outpatient Medications   Medication     adapalene (DIFFERIN) 0.1 % external cream     albuterol (PROAIR HFA/PROVENTIL HFA/VENTOLIN HFA) 108 (90 Base) MCG/ACT inhaler     COSENTYX SENSOREADY  MG/ML Sensoready pen     fluocinonide (LIDEX) 0.05 % external gel     IBUPROFEN PO     ketoconazole (NIZORAL) 2 % external shampoo     levonorgestrel (MIRENA) 20 MCG/24HR IUD     mometasone (ELOCON) 0.1 % external solution     Multiple Vitamins-Minerals (MULTIVITAMIN ADULTS PO)     naratriptan (AMERGE) 2.5 MG tablet     rizatriptan (MAXALT-MLT) 10 MG ODT     Current Facility-Administered Medications   Medication     Botulinum Toxin Type A (BOTOX) 200 units injection 150 Units     Botulinum Toxin Type A (BOTOX) 200 units injection 150 Units     Botulinum Toxin Type A (BOTOX) 200 units injection 150 Units       PHYSICAL EXAMINATION:  Focused on the legs  -R posterior ankle with 2 mm pink papule with stellate white center.             Please do not hesitate to contact me if you have any questions/concerns.     Sincerely,       Syeda Rowe MD

## 2024-03-18 NOTE — NURSING NOTE
Clinic Administered Medication Documentation        Patient was given 2mg of Kenalog-10. Prior to medication administration, verified patient's identity using patient s name and date of birth. Please see MAR and medication order for additional information. Patient instructed to remain in clinic for 15 minutes and report any adverse reaction to staff immediately.    Vial/Syringe: Multi dose vial    The following medication was given:     MEDICATION: Kenalog 10 mg  ROUTE: Intralesional  SITE: RLL  DOSE: 2mg  LOT #: [id number]  :  [employer]  EXPIRATION DATE:  12/2025    Sarah Brown CMA

## 2024-03-19 RX ORDER — LIDOCAINE HYDROCHLORIDE AND EPINEPHRINE 10; 10 MG/ML; UG/ML
3 INJECTION, SOLUTION INFILTRATION; PERINEURAL ONCE
Status: ACTIVE | OUTPATIENT
Start: 2024-03-19

## 2024-03-19 NOTE — PROGRESS NOTES
DPL:  1. Acne vulgaris, lower facial. Topical adapalene. Past spironolactone.   2. Seborrheic dermatitis with predominant pruritus.   3. DSAP- past history of PDT      ASSESSMENT AND PLAN:   1.Neoplasm of uncertain behavior on the R posterior ankle. Suspected irritated dermatofibroma.  Biopsy for diagnosis and treatment with intralesional kenalog to prevent recurrence in the setting of DF.     Procedure Note:  Punch biopsy:  After discussion of benefits and risks including but not limited to bleeding/bruising, pain/swelling, infection, scar, incomplete removal, nerve damage/numbness, recurrence, and non-diagnostic biopsy, written consent was obtained. The area was cleaned with isopropyl alcohol. 1 mL of 1% lidocaine with epinephrine was injected to obtain adequate anesthesia of the lesion on the R posterior ankle.  3 mm punch biopsy was performed.  4-0 prolene sutures were utilized to approximate the epidermal edges.  White petroleum jelly/VaselineTM and a bandage was applied to the wound.  Explicit verbal and written wound care instructions were provided.  The patient left the Dermatology Clinic in good condition. The patient was counseled to follow up for suture removal in approximately 10 days. A total of 0.2 ml of intralesional kenalog 10 mg/ml injected into the biopsy site.       The patient to return to clinic prn.     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 No past treatment.     Patient Active Problem List   Diagnosis    NO ACTIVE PROBLEMS    Encounter for removal and reinsertion of intrauterine contraceptive device       Allergies   Allergen Reactions    Sulfasalazine Rash     Rash and elevated LFT         Current Outpatient Medications   Medication     adapalene (DIFFERIN) 0.1 % external cream    albuterol (PROAIR HFA/PROVENTIL HFA/VENTOLIN HFA) 108 (90 Base) MCG/ACT inhaler    COSENTYX SENSOREADY  MG/ML Sensoready pen    fluocinonide (LIDEX) 0.05 % external gel    IBUPROFEN PO    ketoconazole (NIZORAL) 2 % external shampoo    levonorgestrel (MIRENA) 20 MCG/24HR IUD    mometasone (ELOCON) 0.1 % external solution    Multiple Vitamins-Minerals (MULTIVITAMIN ADULTS PO)    naratriptan (AMERGE) 2.5 MG tablet    rizatriptan (MAXALT-MLT) 10 MG ODT     Current Facility-Administered Medications   Medication    Botulinum Toxin Type A (BOTOX) 200 units injection 150 Units    Botulinum Toxin Type A (BOTOX) 200 units injection 150 Units    Botulinum Toxin Type A (BOTOX) 200 units injection 150 Units       PHYSICAL EXAMINATION:  Focused on the legs  -R posterior ankle with 2 mm pink papule with stellate white center.

## 2024-03-20 DIAGNOSIS — L70.0 ACNE VULGARIS: Primary | ICD-10-CM

## 2024-03-20 LAB
PATH REPORT.COMMENTS IMP SPEC: NORMAL
PATH REPORT.COMMENTS IMP SPEC: NORMAL
PATH REPORT.FINAL DX SPEC: NORMAL
PATH REPORT.GROSS SPEC: NORMAL
PATH REPORT.MICROSCOPIC SPEC OTHER STN: NORMAL
PATH REPORT.RELEVANT HX SPEC: NORMAL

## 2024-03-20 NOTE — PROGRESS NOTES
Reviewed patient's lower facial acne flaring. Recommended trial of topical Clascoterone daily.  Has tried/failed: adapalene, bpo wash, sal acid wash. Not able to tolerate po doxycycline.     Syeda Rowe MD   of Dermatology  [facility]

## 2024-03-25 ENCOUNTER — TELEPHONE (OUTPATIENT)
Dept: DERMATOLOGY | Facility: CLINIC | Age: 44
End: 2024-03-25
Payer: COMMERCIAL

## 2024-03-25 NOTE — TELEPHONE ENCOUNTER
"Prior Authorization Retail Medication Request    Medication/Dose: Clascoterone 1 % CREAM  Sig - Route: Externally apply 1 Application topically daily - Apply externally   Diagnosis and ICD code (if different than what is on RX):    Acne vulgaris [L70.0]     New/renewal/insurance change PA/secondary ins. PA:  Previously Tried and Failed:  adapalene (DIFFERIN) 0.1 % external cream  doxycycline Monohydrate 50 MG CAPS   doxycycline, Rosacea, (ORACEA) 40 MG CPDR  spironolactone (ALDACTONE) 50 MG tablet   Rationale: \"Article from [employer]  Results of 2 phase 3 randomized clinical trials including 1440 patients demonstrated that patients with acne treated with clascoterone cream, 1%, experienced greater treatment success vs treatment with vehicle, with considerable reductions in absolute noninflammatory and inflammatory lesion counts. Adverse events with use of clascoterone cream, 1%, were predominantly mild and similar to those with use of vehicle.\"  https://jamanetwork.com/journals/jamadermatology/fullarticle/3897694 Clascoterone cream is used to treat acne vulgaris. Within the skin, WINLEVI binds to androgen receptors, competing with the binding of androgens such as DHT. Studies suggest this inhibits the action of androgen receptors on cells in the sebaceous gland, reducing sebum production and inflammation.    Insurance   Primary: Not provided  Insurance ID:      Secondary (if applicable):  Insurance ID:      Pharmacy Information (if different than what is on RX)  [facility]  Phone:  [phone]   Fax:[phone]    "

## 2024-04-01 DIAGNOSIS — G43.709 CHRONIC MIGRAINE WITHOUT AURA WITHOUT STATUS MIGRAINOSUS, NOT INTRACTABLE: ICD-10-CM

## 2024-04-01 RX ORDER — NARATRIPTAN 2.5 MG/1
TABLET ORAL
Qty: 54 TABLET | Refills: 0 | OUTPATIENT
Start: 2024-04-01

## 2024-04-05 NOTE — TELEPHONE ENCOUNTER
Retail Pharmacy Prior Authorization Team   Phone: [phone]    PA DENIED  If the provider would like to appeal we will need a detailed   letter of medical necessity with clinical reasoning to start the process.   Please close the encounter when finished.   Thank you,  Samanta Miranda CPhT    PA Team

## 2024-04-05 NOTE — TELEPHONE ENCOUNTER
Retail Pharmacy Prior Authorization Team   Phone: [phone]    PA Initiation    Medication: WINLEVI 1 % EX CREA  Insurance Company: [employer] - Phone [phone] Fax [phone]  Pharmacy Filling the Rx: [id number] - [place], [place] - [address] AT [facility] [address]  Filling Pharmacy Phone: [phone]  Filling Pharmacy Fax:    Start Date: 4/5/2024      Note: Due to record-high volumes, our turn-around time is taking longer than usual . We are currently 10 business days behind in the pools.   We are working diligently to submit all requests in a timely manner and in the order they are received. Please only flag TRUE URGENT requests as high priority to the pool at this time.   If you have questions - please send a note/message in the active PA encounter and send back to the RPPA (Retail Pharmacy Prior Authorization) team [[id number]].    If you have more specific questions about our process please reach out to our supervisor Arely Chandler.   Thank you!

## 2024-04-05 NOTE — TELEPHONE ENCOUNTER
Retail Pharmacy Prior Authorization Team   Phone: [phone]    PRIOR AUTHORIZATION DENIED    Medication: WINLEVI 1 % EX CREA  Insurance Company: [employer] - Phone [phone] Fax [phone]  Denial Date: 4/5/2024  Denial Reason(s): PRODUCT IS NOT COVERED UNDER PHARMACY BENEFITS      Appeal Information:

## 2024-05-08 ENCOUNTER — ALLIED HEALTH/NURSE VISIT (OUTPATIENT)
Dept: INTERNAL MEDICINE | Facility: CLINIC | Age: 44
End: 2024-05-08
Payer: COMMERCIAL

## 2024-05-08 DIAGNOSIS — Z23 NEED FOR VACCINATION: Primary | ICD-10-CM

## 2024-05-08 PROCEDURE — 90471 IMMUNIZATION ADMIN: CPT

## 2024-05-08 PROCEDURE — 99207 PR NO CHARGE NURSE ONLY: CPT

## 2024-05-08 PROCEDURE — 90651 9VHPV VACCINE 2/3 DOSE IM: CPT

## 2024-05-08 NOTE — PROGRESS NOTES
Lexii Mendoza received the second HPV vaccine today in clinic at the request of Dr. Antoine. The immunization was given under the supervision of Dr. Franklin if assistance was needed. The patient does not report a history of adverse reactions associated with vaccine administration. The immunization site was cleaned with an alcohol prep wipe. The immunization was given without incident--see immunization list for administration details. No swelling or redness was observed at the site of injection after the immunization was given. The patient was advised to remain in fourth floor lobby of the [facility] for fifteen minutes after the injection in case of an adverse reaction.     Renetta Jeffries, EMT at 9:10 AM on 5/8/2024

## 2024-05-24 ENCOUNTER — OFFICE VISIT (OUTPATIENT)
Dept: NEUROLOGY | Facility: CLINIC | Age: 44
End: 2024-05-24
Payer: COMMERCIAL

## 2024-05-24 DIAGNOSIS — G43.709 CHRONIC MIGRAINE WITHOUT AURA WITHOUT STATUS MIGRAINOSUS, NOT INTRACTABLE: Primary | ICD-10-CM

## 2024-05-24 PROCEDURE — 64615 CHEMODENERV MUSC MIGRAINE: CPT | Performed by: PSYCHIATRY & NEUROLOGY

## 2024-05-24 NOTE — PROGRESS NOTES
[facility]  Botulinum Toxin Procedure    Lexie Moore MD  Headache Neurology    May 24, 2024    Procedure:  OnabotulinumtoxinA injections for chronic migraine  Indication:  Chronic migraine    Lexii Mendoza suffers from severe intractable headaches. She was referred by Rufina Husain PA-C for onabotulinumtoxinA injections for headache.       Her headaches meet criteria for chronic migraine. At baseline, headaches are right-sided aching or throbbing pain. At times she can have tightness across the occiput, at the back of her neck. Headaches last greater than 4 hours in duration. She has associated photophobia, phonophobia.      Prior to initiation of botulinum toxin injections, Lexii reported 30/30 headache days per month, with 8/30 severe headache days per month. Her headaches are quite disabling and often interfere with her ability to function normally.     Previous round of botulinum toxin injections: 2/23/2024     Lexii reports 4-6/30 headache days per month currently, with 4-6/30 severe headache days per month.      She has attempted other migraine prophylactic treatments in the past, which have included: topiramate (side effects), verapamil (not effective).      She currently takes no other medications for headache prevention.    Procedural Pause: Procedural pause was conducted to verify correct patient identity, procedure to be performed, correct side and site, correct patient position, and special requirements. Appropriate hand hygiene was utilized, and each injection site was prepped with alcohol wipe or Chloraprep swab.     Procedure Details: 200 units of onabotulinumtoxinA was diluted in 4 mL 0.9% normal saline. A total of 150 units of onabotulinumtoxinA were injected using 30 gauge 0.5 in needles into the muscles listed below. 50 units of onabotulinumtoxinA were wasted.     Injection Sites: Total = 150 units onabotulinumtoxinA      and Procerus muscles - 5 units into the left and  right corrugators and 5 units into the procerus (15 units total)    **Frontalis muscles - 5 units into the left superior frontalis and 5 units into the right superior frontalis (2 injection sites per muscle) (10 units total) **moved anteriorly to avoid dropping     Temporalis muscles - 12.5 units into the left temporalis muscle and 12.5 units into the right temporalis muscle (2 injection sites per muscle) (25 units total)    Occipitalis muscles - 12.5 units into the left occipitalis muscle and 12.5 units into the right occipitalis muscle (2 injection sites per muscle) (25 units total)    Splenius Capitis muscles - 12.5 units into the left splenius capitis muscle and 12.5 units into the right splenius capitis muscle (2 injection sites per muscle, divided into 2/3 anteriorly and 1/3 posteriorly) (25 units total)      Trapezius muscles - 25 units into the left trapezius muscle and 25 units into the right trapezius muscle (3 injection sites per muscle, divided 5 units, 10 units, 10 units, medial to lateral) (50 units total)    Ms. Mendoza tolerated the procedure well without immediate complications.  She will follow up in clinic for assessment of the effectiveness of treatment.  She did not report any change in her pain level after the botulinumtoxinA injection procedure.    Lexie Moore MD  Headache Neurology  [facility]

## 2024-05-24 NOTE — LETTER
5/24/2024         RE: Lexii Mendoza  [address] [place] [address]        Dear Colleague,    Thank you for referring your patient, Lexii Mendoza, to the [facility]. Please see a copy of my visit note below.    [facility]  Botulinum Toxin Procedure    Lexie Moore MD  Headache Neurology    May 24, 2024    Procedure:  OnabotulinumtoxinA injections for chronic migraine  Indication:  Chronic migraine    Lexii Mendoza suffers from severe intractable headaches. She was referred by Rufina Husain PA-C for onabotulinumtoxinA injections for headache.       Her headaches meet criteria for chronic migraine. At baseline, headaches are right-sided aching or throbbing pain. At times she can have tightness across the occiput, at the back of her neck. Headaches last greater than 4 hours in duration. She has associated photophobia, phonophobia.      Prior to initiation of botulinum toxin injections, Lexii reported 30/30 headache days per month, with 8/30 severe headache days per month. Her headaches are quite disabling and often interfere with her ability to function normally.     Previous round of botulinum toxin injections: 2/23/2024     Lexii reports 4-6/30 headache days per month currently, with 4-6/30 severe headache days per month.      She has attempted other migraine prophylactic treatments in the past, which have included: topiramate (side effects), verapamil (not effective).      She currently takes no other medications for headache prevention.    Procedural Pause: Procedural pause was conducted to verify correct patient identity, procedure to be performed, correct side and site, correct patient position, and special requirements. Appropriate hand hygiene was utilized, and each injection site was prepped with alcohol wipe or Chloraprep swab.     Procedure Details: 200 units of onabotulinumtoxinA was diluted in 4 mL 0.9% normal saline. A total of 150 units of  onabotulinumtoxinA were injected using 30 gauge 0.5 in needles into the muscles listed below. 50 units of onabotulinumtoxinA were wasted.     Injection Sites: Total = 150 units onabotulinumtoxinA      and Procerus muscles - 5 units into the left and right corrugators and 5 units into the procerus (15 units total)    **Frontalis muscles - 5 units into the left superior frontalis and 5 units into the right superior frontalis (2 injection sites per muscle) (10 units total) **moved anteriorly to avoid dropping     Temporalis muscles - 12.5 units into the left temporalis muscle and 12.5 units into the right temporalis muscle (2 injection sites per muscle) (25 units total)    Occipitalis muscles - 12.5 units into the left occipitalis muscle and 12.5 units into the right occipitalis muscle (2 injection sites per muscle) (25 units total)    Splenius Capitis muscles - 12.5 units into the left splenius capitis muscle and 12.5 units into the right splenius capitis muscle (2 injection sites per muscle, divided into 2/3 anteriorly and 1/3 posteriorly) (25 units total)      Trapezius muscles - 25 units into the left trapezius muscle and 25 units into the right trapezius muscle (3 injection sites per muscle, divided 5 units, 10 units, 10 units, medial to lateral) (50 units total)    Ms. Mendoza tolerated the procedure well without immediate complications.  She will follow up in clinic for assessment of the effectiveness of treatment.  She did not report any change in her pain level after the botulinumtoxinA injection procedure.    Lexie Moore MD  Headache Neurology  [facility]      Again, thank you for allowing me to participate in the care of your patient.        Sincerely,        Lexie Moore MD

## 2024-06-04 ENCOUNTER — TELEPHONE (OUTPATIENT)
Dept: GASTROENTEROLOGY | Facility: CLINIC | Age: 44
End: 2024-06-04
Payer: COMMERCIAL

## 2024-06-04 NOTE — TELEPHONE ENCOUNTER
Attempted to contact patient in order to complete pre assessment questions.     No answer. Left message to return call to [phone] option 4    Callback communication sent via [employer].    Paula Mike RN

## 2024-06-04 NOTE — TELEPHONE ENCOUNTER
Pre visit planning completed.      Procedure details:    Patient scheduled for Colonoscopy  on 6.13.2024.     Arrival time: 0645. Procedure time 0730    Facility location: [facility]; [address] [place] [address] [place] [address]. Check in location: 1st Floor [facility].     Sedation type: Conscious sedation     Pre op exam needed? N/A    Indication for procedure: screening colonoscopy      Chart review:     Electronic implanted devices? No    Recent diagnosis of diverticulitis within the last 6 weeks? No    Diabetic? No      Medication review:    Anticoagulants? No    NSAIDS? No NSAID medications per patient's medication list.  RN will verify with pre-assessment call.    Other medication HOLDING recommendations:  N/A      Prep for procedure:     Bowel prep recommendation: Standard Miralax  Due to: standard bowel prep.    Prep instructions sent via [employer]         Paula Mike RN  Endoscopy Procedure Pre Assessment RN  [phone] option 4

## 2024-06-05 NOTE — TELEPHONE ENCOUNTER
Pre assessment completed for upcoming procedure.   (Please see previous telephone encounter notes for complete details)    Patient  returned call.       Procedure details:    Arrival time and facility location reviewed.    Pre op exam needed? N/A    Designated  policy reviewed. Instructed to have someone stay 6  hours post procedure.       Medication review:    NSAID medication(s): Ibuprofen (Advil, Motrin): HOLD 1 day before procedure.      Prep for procedure:     Procedure prep instructions reviewed.        Any additional information needed:  N/A      Patient  verbalized understanding and had no questions or concerns at this time.      Yolette Cool RN  Endoscopy Procedure Pre Assessment   [phone] option 4

## 2024-06-13 ENCOUNTER — HOSPITAL ENCOUNTER (OUTPATIENT)
Facility: CLINIC | Age: 44
Discharge: HOME | End: 2024-06-13
Attending: COLON & RECTAL SURGERY | Admitting: COLON & RECTAL SURGERY
Payer: COMMERCIAL

## 2024-06-13 VITALS
SYSTOLIC BLOOD PRESSURE: 95 MMHG | RESPIRATION RATE: 10 BRPM | HEART RATE: 54 BPM | BODY MASS INDEX: 19.77 KG/M2 | HEIGHT: 66 IN | DIASTOLIC BLOOD PRESSURE: 63 MMHG | OXYGEN SATURATION: 99 % | WEIGHT: 123 LBS

## 2024-06-13 LAB — COLONOSCOPY: NORMAL

## 2024-06-13 PROCEDURE — G0500 MOD SEDAT ENDO SERVICE >5YRS: HCPCS | Performed by: COLON & RECTAL SURGERY

## 2024-06-13 PROCEDURE — 258N000003 HC RX IP 258 OP 636: Mod: JZ | Performed by: COLON & RECTAL SURGERY

## 2024-06-13 PROCEDURE — 45378 DIAGNOSTIC COLONOSCOPY: CPT | Performed by: COLON & RECTAL SURGERY

## 2024-06-13 PROCEDURE — 250N000011 HC RX IP 250 OP 636: Mod: JZ | Performed by: COLON & RECTAL SURGERY

## 2024-06-13 PROCEDURE — G0121 COLON CA SCRN NOT HI RSK IND: HCPCS | Performed by: COLON & RECTAL SURGERY

## 2024-06-13 RX ORDER — LIDOCAINE 40 MG/G
CREAM TOPICAL
Status: DISCONTINUED | OUTPATIENT
Start: 2024-06-13 | End: 2024-06-13 | Stop reason: HOSPADM

## 2024-06-13 RX ORDER — ONDANSETRON 2 MG/ML
INJECTION INTRAMUSCULAR; INTRAVENOUS PRN
Status: DISCONTINUED | OUTPATIENT
Start: 2024-06-13 | End: 2024-06-13 | Stop reason: HOSPADM

## 2024-06-13 RX ORDER — SODIUM CHLORIDE 9 MG/ML
INJECTION, SOLUTION INTRAVENOUS CONTINUOUS PRN
Status: DISCONTINUED | OUTPATIENT
Start: 2024-06-13 | End: 2024-06-13 | Stop reason: HOSPADM

## 2024-06-13 RX ORDER — FENTANYL CITRATE 50 UG/ML
INJECTION, SOLUTION INTRAMUSCULAR; INTRAVENOUS PRN
Status: DISCONTINUED | OUTPATIENT
Start: 2024-06-13 | End: 2024-06-13 | Stop reason: HOSPADM

## 2024-06-13 RX ORDER — ONDANSETRON 2 MG/ML
4 INJECTION INTRAMUSCULAR; INTRAVENOUS
Status: DISCONTINUED | OUTPATIENT
Start: 2024-06-13 | End: 2024-06-13 | Stop reason: HOSPADM

## 2024-06-13 ASSESSMENT — ACTIVITIES OF DAILY LIVING (ADL)
ADLS_ACUITY_SCORE: 35
ADLS_ACUITY_SCORE: 33

## 2024-06-13 NOTE — H&P
Colon & Rectal Surgery History and Physical  Pre-Endoscopy Procedure Note    History of Present Illness   I have been asked by Dr. Arash Garcia to evaluate this 44 year old female for colorectal cancer screening. She currently denies any abdominal pain, weight loss, bleeding per rectum, or recent change in bowel habits.    Past Medical History  Diagnosis Date    Arthritis     Breast disorder     extra breast tissue under right armpit    Fertility problem     in-vitro x2 with 2 resulting pregnancies    PONV (postoperative nausea and vomiting)     Uncomplicated asthma 2020    Varicosities        Past Surgical History  Procedure Laterality Date    NO HISTORY OF SURGERY          Medications  Medications Prior to Admission   Medication Sig    albuterol (PROAIR HFA/PROVENTIL HFA/VENTOLIN HFA) 108 (90 Base) MCG/ACT inhaler Inhale 2 puffs into the lungs every 4 hours as needed for shortness of breath / dyspnea or wheezing    COSENTYX SENSOREADY  MG/ML Sensoready pen Inject 150 mg Subcutaneous    IBUPROFEN PO Take 400 mg by mouth    Multiple Vitamins-Minerals (MULTIVITAMIN ADULTS PO)     rizatriptan (MAXALT-MLT) 10 MG ODT Take 1 tablet (10 mg) by mouth at onset of headache for migraine (repeat in 2 hours if needed) May repeat in 2 hours. Max 3 tablets/24 hours.    adapalene (DIFFERIN) 0.1 % external cream To face at bedtime    fluocinonide (LIDEX) 0.05 % external gel Twice daily to arthropod bites as needed. 30g=1 month    ketoconazole (NIZORAL) 2 % external shampoo Use to shampoo twice weekly. Leave on scalp 5 minutes then rinse.    levonorgestrel (MIRENA) 20 MCG/24HR IUD 1 each (20 mcg) by Intrauterine route once    mometasone (ELOCON) 0.1 % external solution Apply to scalp twice weekly as needed for itch. 60g=1 month       Allergies  Allergen Reactions    Sulfasalazine Rash     Rash and elevated LFT        Family History   Family history includes Coronary Artery Disease in her father; Hypertension in her father;  "Substance Abuse in an other family member.     Social History   She reports that she has never smoked. She has never used smokeless tobacco. She reports current alcohol use of about 2.0 standard drinks of alcohol per week. She reports that she does not use drugs.    Review of Systems   Constitutional:  No fever, weight change or fatigue.    Eyes:     No dry eyes or vision changes.   Ears/Nose/Throat/Neck:  No oral ulcers, sore throat or voice change.    Cardiovascular:   No palpitations, syncope, angina or edema.   Respiratory:    No chest pain, excessive sleepiness, shortness of breath or hemoptysis.    Gastrointestinal:   No abdominal pain, nausea, vomiting, diarrhea or heartburn.    Genitourinary:   No dysuria, hematuria, urinary retention or urinary frequency.   Musculoskeletal:  No joint swelling or arthralgias.    Dermatologic:  No skin rash or other skin changes.   Neurologic:    No focal weakness or numbness. No neuropathy.   Psychiatric:    No depression, anxiety, suicidal ideation, or paranoid ideation.   Endocrine:   No cold or heat intolerance, polydipsia, hirsutism, change in libido, or flushing.   Hematology/Lymphatic:  No bleeding or lymphadenopathy.    Allergy/Immunology:  No rhinitis or hives.     Physical Exam   Vitals:  Blood pressure 108/78, pulse 65, resp. rate 16, height 1.676 m (5' 6\"), weight 55.8 kg (123 lb), SpO2 100%, not currently breastfeeding.    General:  Alert and oriented to person, place and time   Airway: Normal oropharyngeal airway and neck mobility   Lungs:  Clear bilaterally   Heart:  Regular rate and rhythm   Abdomen: Soft, NT, ND, no masses   Extremities: Warm, good capillary refill    ASA Grade: II (mild systemic disease)    Impression: Cleared for use of conscious sedation for colorectal cancer screening    Plan: Proceed with colonoscopy     Laila Geller MD  [facility]  [phone]  "

## 2024-07-31 DIAGNOSIS — G43.709 CHRONIC MIGRAINE WITHOUT AURA WITHOUT STATUS MIGRAINOSUS, NOT INTRACTABLE: Primary | ICD-10-CM

## 2024-08-29 ENCOUNTER — OFFICE VISIT (OUTPATIENT)
Dept: NEUROLOGY | Facility: CLINIC | Age: 44
End: 2024-08-29
Payer: COMMERCIAL

## 2024-08-29 DIAGNOSIS — G43.709 CHRONIC MIGRAINE WITHOUT AURA WITHOUT STATUS MIGRAINOSUS, NOT INTRACTABLE: Primary | ICD-10-CM

## 2024-08-29 PROCEDURE — 64615 CHEMODENERV MUSC MIGRAINE: CPT

## 2024-08-29 NOTE — LETTER
8/29/2024      Lexii Mendoza  [address] [place] [address]      Dear Colleague,    Thank you for referring your patient, Lexii Mendoza, to the [facility]. Please see a copy of my visit note below.    [facility]  Botulinum Toxin Procedure    Rfuina Husain PA-C  Headache Neurology    August 29, 2024    Procedure: OnabotulinumtoxinA injections for chronic migraine  Indication: Chronic migraine    Lexii Mendoza suffers from severe intractable headaches. She was referred by Rufina Husain PA-C for onabotulinumtoxinA injections for headache.       Her headaches meet criteria for chronic migraine. At baseline, headaches are right-sided aching or throbbing pain. At times she can have tightness across the occiput, at the back of her neck. Headaches last greater than 4 hours in duration. She has associated photophobia, phonophobia.      Prior to initiation of botulinum toxin injections, Lexii reported 30/30 headache days per month, with 8/30 severe headache days per month. Her headaches are quite disabling and often interfere with her ability to function normally.    Their last round of botulinum toxin injections was on 5/24/2024.    Lexii reports 3-6 headache days per month currently, with 3-5 severe headache days per month. She has not noticed any significant wearing off effect.    Lexii reports the following benefits of botulinum toxin injections from their last round: No more daily nagging headache.     She has attempted other migraine prophylactic treatments in the past, which have included: topiramate (side effects), verapamil (not effective).      She currently takes no other medications for headache prevention.    Patient's pain was assessed prior to the procedure. She rated her pain today as 0 out of 10.      The procedure was explained to the patient. Benefits of the treatment were discussed including headache and migraine reduction. Risks of the procedure  were reviewed including but not limited to pain, bruising, bleeding, infection, and weakness of muscles injected or those distal to injection sites. Alternatives were discussed. The patient voiced understanding of the risks and benefits. All questions answered and patient consented to proceed.    Procedural Pause: Procedural pause was conducted to verify correct patient identity, procedure to be performed, correct side and site, correct patient position, and special requirements. Appropriate hand hygiene was utilized, and each injection site was prepped with alcohol wipes or Chloraprep swab.     Procedure Details:   200 units of onabotulinumtoxinA was diluted in 4 mL 0.9% normal saline.   A total of 150 units of onabotulinumtoxinA were injected using 30 gauge 0.5 inch needles into the muscles listed below. 50 units of onabotulinumtoxinA were wasted.     Injection Sites: Total = 150 units onabotulinumtoxinA     Procerus muscles - 5 units into the procerus muscle (5 units total)     muscles - 5 units into the left  muscle and 5 units into the right  muscle (1 injection site per muscle) (10 units total)    Frontalis muscles - 5 units into the left superior frontalis muscle and 5 units into the right superior frontalis muscle (2 injection sites per muscle) (10 units total)    Temporalis muscles - 12.5 units into the left temporalis muscle and 12.5 units into the right temporalis muscle (2 injection sites per muscle) (25 units total)    Occipitalis muscles - 12.5 units into the left occipitalis muscle and 12.5 units into the right occipitalis muscle (2 injection sites per muscle) (25 units total)    Splenius Capitis muscles - 12.5 units into the left splenius capitis muscle and 12.5 units into the right splenius capitis muscle (2 injection sites per muscle, divided into 2/3 anteriorly and 1/3 posteriorly) (25 units total)      Trapezius muscles - 25 units into the left trapezius muscle and  25 units into the right trapezius muscle (3 injection sites per muscle, divided into 5 units, 10 units, 10 units, medial to lateral) (50 units total)      Patient tolerated the procedure well without immediate complications. She will follow up in clinic for assessment of the effectiveness of treatment. She did not report any change in her pain level after the botulinumtoxinA injection procedure.    Patient should have refills of rizatriptan available - 18 tabs with 11 refills sent 6/10/2024.      Jatin Husain PA-C  Headache Neurology  [facility]rology [facility]      Again, thank you for allowing me to participate in the care of your patient.        Sincerely,        JATIN HUSAIN PA-C

## 2024-08-29 NOTE — PROGRESS NOTES
[facility]  Botulinum Toxin Procedure    Rufina Husain PA-C  Headache Neurology    August 29, 2024    Procedure: OnabotulinumtoxinA injections for chronic migraine  Indication: Chronic migraine    Lexii Mendoza suffers from severe intractable headaches. She was referred by Rufina Husain PA-C for onabotulinumtoxinA injections for headache.       Her headaches meet criteria for chronic migraine. At baseline, headaches are right-sided aching or throbbing pain. At times she can have tightness across the occiput, at the back of her neck. Headaches last greater than 4 hours in duration. She has associated photophobia, phonophobia.      Prior to initiation of botulinum toxin injections, Lexii reported 30/30 headache days per month, with 8/30 severe headache days per month. Her headaches are quite disabling and often interfere with her ability to function normally.    Their last round of botulinum toxin injections was on 5/24/2024.    Lexii reports 3-6 headache days per month currently, with 3-5 severe headache days per month. She has not noticed any significant wearing off effect.    Lexii reports the following benefits of botulinum toxin injections from their last round: No more daily nagging headache.     She has attempted other migraine prophylactic treatments in the past, which have included: topiramate (side effects), verapamil (not effective).      She currently takes no other medications for headache prevention.    Patient's pain was assessed prior to the procedure. She rated her pain today as 0 out of 10.      The procedure was explained to the patient. Benefits of the treatment were discussed including headache and migraine reduction. Risks of the procedure were reviewed including but not limited to pain, bruising, bleeding, infection, and weakness of muscles injected or those distal to injection sites. Alternatives were discussed. The patient voiced understanding of the risks and benefits. All  questions answered and patient consented to proceed.    Procedural Pause: Procedural pause was conducted to verify correct patient identity, procedure to be performed, correct side and site, correct patient position, and special requirements. Appropriate hand hygiene was utilized, and each injection site was prepped with alcohol wipes or Chloraprep swab.     Procedure Details:   200 units of onabotulinumtoxinA was diluted in 4 mL 0.9% normal saline.   A total of 150 units of onabotulinumtoxinA were injected using 30 gauge 0.5 inch needles into the muscles listed below. 50 units of onabotulinumtoxinA were wasted.     Injection Sites: Total = 150 units onabotulinumtoxinA     Procerus muscles - 5 units into the procerus muscle (5 units total)     muscles - 5 units into the left  muscle and 5 units into the right  muscle (1 injection site per muscle) (10 units total)    Frontalis muscles - 5 units into the left superior frontalis muscle and 5 units into the right superior frontalis muscle (2 injection sites per muscle) (10 units total)    Temporalis muscles - 12.5 units into the left temporalis muscle and 12.5 units into the right temporalis muscle (2 injection sites per muscle) (25 units total)    Occipitalis muscles - 12.5 units into the left occipitalis muscle and 12.5 units into the right occipitalis muscle (2 injection sites per muscle) (25 units total)    Splenius Capitis muscles - 12.5 units into the left splenius capitis muscle and 12.5 units into the right splenius capitis muscle (2 injection sites per muscle, divided into 2/3 anteriorly and 1/3 posteriorly) (25 units total)      Trapezius muscles - 25 units into the left trapezius muscle and 25 units into the right trapezius muscle (3 injection sites per muscle, divided into 5 units, 10 units, 10 units, medial to lateral) (50 units total)      Patient tolerated the procedure well without immediate complications. She will follow up  in clinic for assessment of the effectiveness of treatment. She did not report any change in her pain level after the botulinumtoxinA injection procedure.    Patient should have refills of rizatriptan available - 18 tabs with 11 refills sent 6/10/2024.      Rufina Husain PA-C  Headache Neurology  [facility]rology [facility]
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
## 2024-09-10 RX ORDER — FLUOCINONIDE GEL 0.5 MG/G
GEL TOPICAL
Qty: 30 G | Refills: 11 | Status: SHIPPED | OUTPATIENT
Start: 2024-09-10

## 2024-09-11 ENCOUNTER — TRANSFERRED RECORDS (OUTPATIENT)
Dept: HEALTH INFORMATION MANAGEMENT | Facility: CLINIC | Age: 44
End: 2024-09-11
Payer: COMMERCIAL

## 2024-09-25 ENCOUNTER — ANCILLARY PROCEDURE (OUTPATIENT)
Dept: MAMMOGRAPHY | Facility: CLINIC | Age: 44
End: 2024-09-25
Attending: INTERNAL MEDICINE
Payer: COMMERCIAL

## 2024-09-25 DIAGNOSIS — Z12.31 VISIT FOR SCREENING MAMMOGRAM: ICD-10-CM

## 2024-09-25 PROCEDURE — 77063 BREAST TOMOSYNTHESIS BI: CPT | Mod: GC

## 2024-09-25 PROCEDURE — 77067 SCR MAMMO BI INCL CAD: CPT | Mod: GC

## 2025-04-20 ENCOUNTER — HEALTH MAINTENANCE LETTER (OUTPATIENT)
Age: 45
End: 2025-04-20

## 2025-06-20 ENCOUNTER — MYC REFILL (OUTPATIENT)
Dept: NEUROLOGY | Facility: CLINIC | Age: 45
End: 2025-06-20
Payer: COMMERCIAL

## 2025-06-20 DIAGNOSIS — G43.709 CHRONIC MIGRAINE WITHOUT AURA WITHOUT STATUS MIGRAINOSUS, NOT INTRACTABLE: ICD-10-CM

## 2025-06-23 RX ORDER — RIZATRIPTAN BENZOATE 10 MG/1
10 TABLET, ORALLY DISINTEGRATING ORAL
Qty: 18 TABLET | Refills: 3 | Status: SHIPPED | OUTPATIENT
Start: 2025-06-23

## 2025-06-23 NOTE — TELEPHONE ENCOUNTER
Neuroscience Clinic Task Note    TASK    Neurology Rx Refill  Medication rizatriptan (MAXALT-MLT) 10 MG ODT    Dose Sig - Route: Take 1 tablet (10 mg) by mouth at onset of headache for migraine (repeat in 2 hours if needed) May repeat in 2 hours. Max 3 tablets/24 hours. - Oral    Last refill ordered (m/d/y) 6/10/2024   Last quantity ordered 18   Last # refills 11   Last clinic visit with ordering provider (m/d/y) Last medication follow up: 6/10/2024 with Rufina Husain  Last Botox appt: 8/29/2024   Next clinic visit with ordering provider (m/d/y) 8/28/25 with Dr. Moore     Routing refill request to provider for review/approval because:  No BP on file in the last 12 months.     Leyla JEWELL RN, BSN  [facility]

## 2025-08-25 ASSESSMENT — HEADACHE IMPACT TEST (HIT 6)
HOW OFTEN HAVE YOU FELT FED UP OR IRRITATED BECAUSE OF YOUR HEADACHES: VERY OFTEN
HOW OFTEN HAVE YOU FELT TOO TIRED TO WORK BECAUSE OF YOUR HEADACHES: SOMETIMES
WHEN YOU HAVE A HEADACHE HOW OFTEN DO YOU WISH YOU COULD LIE DOWN: ALWAYS
HOW OFTEN DID HEADACHS LIMIT CONCENTRATION ON WORK OR DAILY ACTIVITY: VERY OFTEN
HOW OFTEN DO HEADACHES LIMIT YOUR DAILY ACTIVITIES: VERY OFTEN
WHEN YOU HAVE HEADACHES HOW OFTEN IS THE PAIN SEVERE: VERY OFTEN
HIT6 TOTAL SCORE: 67

## 2025-08-25 ASSESSMENT — MIGRAINE DISABILITY ASSESSMENT (MIDAS)
HOW MANY DAYS WAS YOUR PRODUCTIVITY CUT IN HALF BECAUSE OF HEADACHES: 10
TOTAL SCORE: 29
HOW OFTEN WERE SOCIAL ACTIVITIES MISSED DUE TO HEADACHES: 5
HOW MANY DAYS DID YOU MISS WORK OR SCHOOL BECAUSE OF HEADACHES: 0
HOW MANY DAYS DID YOU NOT DO HOUSEWORK BECAUSE OF HEADACHES: 4
HOW MANY DAYS IN THE PAST 3 MONTHS HAVE YOU HAD A HEADACHE: 24
HOW MANY DAYS WAS HOUSEWORK PRODUCTIVITY CUT IN HALF DUE TO HEADACHES: 10
ON A SCALE FROM 0-10 ON AVERAGE HOW PAINFUL WERE HEADACHES: 7

## 2025-08-28 ENCOUNTER — TELEPHONE (OUTPATIENT)
Dept: NEUROLOGY | Facility: CLINIC | Age: 45
End: 2025-08-28

## 2025-08-28 ENCOUNTER — VIRTUAL VISIT (OUTPATIENT)
Dept: NEUROLOGY | Facility: CLINIC | Age: 45
End: 2025-08-28
Payer: COMMERCIAL

## 2025-08-28 VITALS — BODY MASS INDEX: 20.57 KG/M2 | WEIGHT: 128 LBS | HEIGHT: 66 IN

## 2025-08-28 DIAGNOSIS — G43.709 CHRONIC MIGRAINE WITHOUT AURA WITHOUT STATUS MIGRAINOSUS, NOT INTRACTABLE: ICD-10-CM

## 2025-08-28 RX ORDER — RIZATRIPTAN BENZOATE 10 MG/1
10 TABLET ORAL
Qty: 9 TABLET | Refills: 11 | Status: SHIPPED | OUTPATIENT
Start: 2025-08-28

## 2025-08-28 RX ORDER — NARATRIPTAN 2.5 MG/1
2.5 TABLET ORAL
Qty: 9 TABLET | Refills: 11 | Status: SHIPPED | OUTPATIENT
Start: 2025-08-28

## 2025-08-28 RX ORDER — FREMANEZUMAB-VFRM 225 MG/1.5ML
1.5 INJECTION SUBCUTANEOUS
Qty: 1.5 ML | Refills: 11 | Status: SHIPPED | OUTPATIENT
Start: 2025-08-28

## 2025-08-28 RX ORDER — RIZATRIPTAN BENZOATE 10 MG/1
10 TABLET, ORALLY DISINTEGRATING ORAL
Qty: 18 TABLET | Refills: 3 | Status: CANCELLED | OUTPATIENT
Start: 2025-08-28

## 2025-08-28 ASSESSMENT — PAIN SCALES - GENERAL: PAINLEVEL_OUTOF10: NO PAIN (0)

## (undated) RX ORDER — FENTANYL CITRATE 50 UG/ML
INJECTION, SOLUTION INTRAMUSCULAR; INTRAVENOUS
Status: DISPENSED
Start: 2024-06-13

## (undated) RX ORDER — ONDANSETRON 2 MG/ML
INJECTION INTRAMUSCULAR; INTRAVENOUS
Status: DISPENSED
Start: 2024-06-13

## (undated) RX ORDER — ALBUTEROL SULFATE 0.83 MG/ML
SOLUTION RESPIRATORY (INHALATION)
Status: DISPENSED
Start: 2019-06-18